# Patient Record
Sex: MALE | Race: WHITE | Employment: OTHER | URBAN - NONMETROPOLITAN AREA
[De-identification: names, ages, dates, MRNs, and addresses within clinical notes are randomized per-mention and may not be internally consistent; named-entity substitution may affect disease eponyms.]

---

## 2017-01-05 ENCOUNTER — ANTI-COAG VISIT (OUTPATIENT)
Dept: OTHER | Age: 77
End: 2017-01-05

## 2017-01-05 VITALS
HEART RATE: 86 BPM | SYSTOLIC BLOOD PRESSURE: 117 MMHG | DIASTOLIC BLOOD PRESSURE: 76 MMHG | WEIGHT: 157 LBS | BODY MASS INDEX: 23.87 KG/M2

## 2017-01-05 DIAGNOSIS — I48.19 PERSISTENT ATRIAL FIBRILLATION (HCC): ICD-10-CM

## 2017-01-05 LAB — POC INR: 1.9 (ref 0.8–1.2)

## 2017-01-05 PROCEDURE — 85610 PROTHROMBIN TIME: CPT | Performed by: NURSE PRACTITIONER

## 2017-01-05 PROCEDURE — 99999 PR OFFICE/OUTPT VISIT,PROCEDURE ONLY: CPT | Performed by: NURSE PRACTITIONER

## 2017-01-05 ASSESSMENT — ENCOUNTER SYMPTOMS
BLOOD IN STOOL: 0
DIARRHEA: 0
CONSTIPATION: 0
SHORTNESS OF BREATH: 0

## 2017-01-30 LAB
AVERAGE GLUCOSE: NORMAL
HBA1C MFR BLD: 5.5 %

## 2017-02-09 ENCOUNTER — ANTI-COAG VISIT (OUTPATIENT)
Dept: OTHER | Age: 77
End: 2017-02-09

## 2017-02-09 VITALS
DIASTOLIC BLOOD PRESSURE: 98 MMHG | WEIGHT: 159 LBS | BODY MASS INDEX: 24.18 KG/M2 | SYSTOLIC BLOOD PRESSURE: 140 MMHG | HEART RATE: 86 BPM

## 2017-02-09 DIAGNOSIS — I48.19 PERSISTENT ATRIAL FIBRILLATION (HCC): ICD-10-CM

## 2017-02-09 LAB
HCT VFR BLD CALC: 42.6 % (ref 42–52)
HEMOGLOBIN: 14.6 GM/DL (ref 14–18)
INR BLD: 7.1
INR BLD: 7.25 (ref 0.85–1.13)

## 2017-02-09 PROCEDURE — 4040F PNEUMOC VAC/ADMIN/RCVD: CPT | Performed by: NURSE PRACTITIONER

## 2017-02-09 PROCEDURE — G8427 DOCREV CUR MEDS BY ELIG CLIN: HCPCS | Performed by: NURSE PRACTITIONER

## 2017-02-09 PROCEDURE — 99999 PR OFFICE/OUTPT VISIT,PROCEDURE ONLY: CPT | Performed by: NURSE PRACTITIONER

## 2017-02-09 PROCEDURE — 85610 PROTHROMBIN TIME: CPT | Performed by: NURSE PRACTITIONER

## 2017-02-09 PROCEDURE — G8420 CALC BMI NORM PARAMETERS: HCPCS | Performed by: NURSE PRACTITIONER

## 2017-02-09 RX ORDER — SILDENAFIL 100 MG/1
100 TABLET, FILM COATED ORAL PRN
COMMUNITY
End: 2017-02-09

## 2017-02-09 ASSESSMENT — ENCOUNTER SYMPTOMS
CONSTIPATION: 0
SHORTNESS OF BREATH: 0
DIARRHEA: 0
BLOOD IN STOOL: 0

## 2017-02-13 ENCOUNTER — ANTI-COAG VISIT (OUTPATIENT)
Dept: OTHER | Age: 77
End: 2017-02-13

## 2017-02-13 VITALS
HEART RATE: 96 BPM | SYSTOLIC BLOOD PRESSURE: 109 MMHG | DIASTOLIC BLOOD PRESSURE: 67 MMHG | WEIGHT: 158.6 LBS | BODY MASS INDEX: 24.12 KG/M2

## 2017-02-13 DIAGNOSIS — I48.19 PERSISTENT ATRIAL FIBRILLATION (HCC): ICD-10-CM

## 2017-02-13 LAB — POC INR: 2.7 (ref 0.8–1.2)

## 2017-02-13 PROCEDURE — 99999 PR OFFICE/OUTPT VISIT,PROCEDURE ONLY: CPT | Performed by: NURSE PRACTITIONER

## 2017-02-13 PROCEDURE — G8427 DOCREV CUR MEDS BY ELIG CLIN: HCPCS | Performed by: NURSE PRACTITIONER

## 2017-02-13 PROCEDURE — 4040F PNEUMOC VAC/ADMIN/RCVD: CPT | Performed by: NURSE PRACTITIONER

## 2017-02-13 PROCEDURE — 85610 PROTHROMBIN TIME: CPT | Performed by: NURSE PRACTITIONER

## 2017-02-13 PROCEDURE — G8420 CALC BMI NORM PARAMETERS: HCPCS | Performed by: NURSE PRACTITIONER

## 2017-02-13 ASSESSMENT — ENCOUNTER SYMPTOMS
DIARRHEA: 0
BLOOD IN STOOL: 0
CONSTIPATION: 0
SHORTNESS OF BREATH: 0

## 2017-02-20 ENCOUNTER — ANTI-COAG VISIT (OUTPATIENT)
Dept: OTHER | Age: 77
End: 2017-02-20

## 2017-02-20 VITALS
SYSTOLIC BLOOD PRESSURE: 96 MMHG | DIASTOLIC BLOOD PRESSURE: 66 MMHG | WEIGHT: 153 LBS | BODY MASS INDEX: 23.26 KG/M2 | HEART RATE: 68 BPM

## 2017-02-20 DIAGNOSIS — R79.1 ELEVATED INR: Primary | ICD-10-CM

## 2017-02-20 DIAGNOSIS — I48.19 PERSISTENT ATRIAL FIBRILLATION (HCC): Primary | ICD-10-CM

## 2017-02-20 DIAGNOSIS — I48.19 PERSISTENT ATRIAL FIBRILLATION (HCC): ICD-10-CM

## 2017-02-20 LAB
HCT VFR BLD CALC: 41.2 % (ref 42–52)
HEMOGLOBIN: 13.5 GM/DL (ref 14–18)
INR BLD: 6.8
INR BLD: 7.22 (ref 0.85–1.13)

## 2017-02-20 PROCEDURE — 4040F PNEUMOC VAC/ADMIN/RCVD: CPT | Performed by: NURSE PRACTITIONER

## 2017-02-20 PROCEDURE — G8420 CALC BMI NORM PARAMETERS: HCPCS | Performed by: NURSE PRACTITIONER

## 2017-02-20 PROCEDURE — G8484 FLU IMMUNIZE NO ADMIN: HCPCS | Performed by: NURSE PRACTITIONER

## 2017-02-20 PROCEDURE — 99212 OFFICE O/P EST SF 10 MIN: CPT | Performed by: NURSE PRACTITIONER

## 2017-02-20 PROCEDURE — 1123F ACP DISCUSS/DSCN MKR DOCD: CPT | Performed by: NURSE PRACTITIONER

## 2017-02-20 PROCEDURE — G8427 DOCREV CUR MEDS BY ELIG CLIN: HCPCS | Performed by: NURSE PRACTITIONER

## 2017-02-20 PROCEDURE — 1036F TOBACCO NON-USER: CPT | Performed by: NURSE PRACTITIONER

## 2017-02-20 PROCEDURE — 85610 PROTHROMBIN TIME: CPT | Performed by: NURSE PRACTITIONER

## 2017-02-20 ASSESSMENT — ENCOUNTER SYMPTOMS
SHORTNESS OF BREATH: 0
BLOOD IN STOOL: 0
CONSTIPATION: 0
DIARRHEA: 1

## 2017-02-27 ENCOUNTER — ANTI-COAG VISIT (OUTPATIENT)
Dept: OTHER | Age: 77
End: 2017-02-27

## 2017-02-27 VITALS
BODY MASS INDEX: 23.87 KG/M2 | WEIGHT: 157 LBS | SYSTOLIC BLOOD PRESSURE: 123 MMHG | HEART RATE: 82 BPM | DIASTOLIC BLOOD PRESSURE: 74 MMHG

## 2017-02-27 DIAGNOSIS — I48.19 PERSISTENT ATRIAL FIBRILLATION (HCC): ICD-10-CM

## 2017-02-27 LAB — POC INR: 2.7 (ref 0.8–1.2)

## 2017-02-27 PROCEDURE — G8427 DOCREV CUR MEDS BY ELIG CLIN: HCPCS | Performed by: NURSE PRACTITIONER

## 2017-02-27 PROCEDURE — 1123F ACP DISCUSS/DSCN MKR DOCD: CPT | Performed by: NURSE PRACTITIONER

## 2017-02-27 PROCEDURE — G8420 CALC BMI NORM PARAMETERS: HCPCS | Performed by: NURSE PRACTITIONER

## 2017-02-27 PROCEDURE — G8484 FLU IMMUNIZE NO ADMIN: HCPCS | Performed by: NURSE PRACTITIONER

## 2017-02-27 PROCEDURE — 4040F PNEUMOC VAC/ADMIN/RCVD: CPT | Performed by: NURSE PRACTITIONER

## 2017-02-27 PROCEDURE — 85610 PROTHROMBIN TIME: CPT | Performed by: NURSE PRACTITIONER

## 2017-02-27 PROCEDURE — 99212 OFFICE O/P EST SF 10 MIN: CPT | Performed by: NURSE PRACTITIONER

## 2017-02-27 PROCEDURE — 1036F TOBACCO NON-USER: CPT | Performed by: NURSE PRACTITIONER

## 2017-02-27 RX ORDER — LISINOPRIL 40 MG/1
TABLET ORAL
Qty: 90 TABLET | Refills: 3 | Status: SHIPPED | OUTPATIENT
Start: 2017-02-27 | End: 2017-06-28 | Stop reason: DRUGHIGH

## 2017-02-27 ASSESSMENT — ENCOUNTER SYMPTOMS
CONSTIPATION: 0
SHORTNESS OF BREATH: 0
BLOOD IN STOOL: 0
DIARRHEA: 0

## 2017-03-22 DIAGNOSIS — I48.19 PERSISTENT ATRIAL FIBRILLATION (HCC): Primary | ICD-10-CM

## 2017-03-27 ENCOUNTER — ANTI-COAG VISIT (OUTPATIENT)
Dept: OTHER | Age: 77
End: 2017-03-27

## 2017-03-27 VITALS
DIASTOLIC BLOOD PRESSURE: 65 MMHG | SYSTOLIC BLOOD PRESSURE: 103 MMHG | BODY MASS INDEX: 23.72 KG/M2 | WEIGHT: 156 LBS | HEART RATE: 76 BPM

## 2017-03-27 DIAGNOSIS — I48.19 PERSISTENT ATRIAL FIBRILLATION (HCC): ICD-10-CM

## 2017-03-27 LAB — POC INR: 2.6 (ref 0.8–1.2)

## 2017-03-27 ASSESSMENT — ENCOUNTER SYMPTOMS
CONSTIPATION: 0
SHORTNESS OF BREATH: 0
BLOOD IN STOOL: 0
DIARRHEA: 0

## 2017-04-12 ENCOUNTER — PROCEDURE VISIT (OUTPATIENT)
Dept: CARDIOLOGY | Age: 77
End: 2017-04-12

## 2017-04-12 DIAGNOSIS — Z95.0 PACEMAKER: Primary | ICD-10-CM

## 2017-04-12 PROCEDURE — 93280 PM DEVICE PROGR EVAL DUAL: CPT | Performed by: INTERNAL MEDICINE

## 2017-04-24 ENCOUNTER — ANTI-COAG VISIT (OUTPATIENT)
Dept: OTHER | Age: 77
End: 2017-04-24

## 2017-04-24 ENCOUNTER — TELEPHONE (OUTPATIENT)
Dept: INTERNAL MEDICINE | Age: 77
End: 2017-04-24

## 2017-04-24 VITALS
SYSTOLIC BLOOD PRESSURE: 127 MMHG | HEART RATE: 70 BPM | BODY MASS INDEX: 23.87 KG/M2 | WEIGHT: 157 LBS | DIASTOLIC BLOOD PRESSURE: 82 MMHG

## 2017-04-24 DIAGNOSIS — I48.19 PERSISTENT ATRIAL FIBRILLATION (HCC): ICD-10-CM

## 2017-04-24 LAB
HCT VFR BLD CALC: 37.1 % (ref 42–52)
HEMOGLOBIN: 12.4 GM/DL (ref 14–18)
INR BLD: 5.8
INR BLD: 6.06 (ref 0.85–1.13)

## 2017-04-24 RX ORDER — OSELTAMIVIR PHOSPHATE 75 MG/1
CAPSULE ORAL
Refills: 0 | COMMUNITY
Start: 2017-02-23 | End: 2017-04-27 | Stop reason: ALTCHOICE

## 2017-04-24 RX ORDER — ERGOCALCIFEROL (VITAMIN D2) 1250 MCG
50000 CAPSULE ORAL
COMMUNITY
Start: 2017-03-29 | End: 2017-06-28 | Stop reason: ALTCHOICE

## 2017-04-24 ASSESSMENT — ENCOUNTER SYMPTOMS
SHORTNESS OF BREATH: 0
DIARRHEA: 0
CONSTIPATION: 0
BLOOD IN STOOL: 0

## 2017-04-27 ENCOUNTER — ANTI-COAG VISIT (OUTPATIENT)
Dept: OTHER | Age: 77
End: 2017-04-27

## 2017-04-27 VITALS
BODY MASS INDEX: 23.54 KG/M2 | HEART RATE: 73 BPM | WEIGHT: 154.8 LBS | DIASTOLIC BLOOD PRESSURE: 63 MMHG | SYSTOLIC BLOOD PRESSURE: 97 MMHG

## 2017-04-27 DIAGNOSIS — I48.19 PERSISTENT ATRIAL FIBRILLATION (HCC): ICD-10-CM

## 2017-04-27 LAB — POC INR: 3.4 (ref 0.8–1.2)

## 2017-04-27 ASSESSMENT — ENCOUNTER SYMPTOMS
DIARRHEA: 0
SHORTNESS OF BREATH: 0
CONSTIPATION: 0
BLOOD IN STOOL: 0

## 2017-05-04 ENCOUNTER — ANTI-COAG VISIT (OUTPATIENT)
Dept: OTHER | Age: 77
End: 2017-05-04

## 2017-05-04 VITALS
DIASTOLIC BLOOD PRESSURE: 67 MMHG | HEART RATE: 78 BPM | BODY MASS INDEX: 23.72 KG/M2 | WEIGHT: 156 LBS | SYSTOLIC BLOOD PRESSURE: 105 MMHG

## 2017-05-04 DIAGNOSIS — I48.19 PERSISTENT ATRIAL FIBRILLATION (HCC): ICD-10-CM

## 2017-05-04 LAB — POC INR: 4.1 (ref 0.8–1.2)

## 2017-05-04 ASSESSMENT — ENCOUNTER SYMPTOMS
DIARRHEA: 0
SHORTNESS OF BREATH: 0
CONSTIPATION: 0
BLOOD IN STOOL: 0

## 2017-05-15 ENCOUNTER — ANTI-COAG VISIT (OUTPATIENT)
Dept: OTHER | Age: 77
End: 2017-05-15

## 2017-05-15 VITALS
WEIGHT: 154.6 LBS | BODY MASS INDEX: 23.51 KG/M2 | HEART RATE: 53 BPM | DIASTOLIC BLOOD PRESSURE: 103 MMHG | SYSTOLIC BLOOD PRESSURE: 142 MMHG

## 2017-05-15 DIAGNOSIS — I48.19 PERSISTENT ATRIAL FIBRILLATION (HCC): ICD-10-CM

## 2017-05-15 LAB
INTERNATIONAL NORMALIZATION RATIO, POC: 2.9
POC INR: 2.9 (ref 0.8–1.2)

## 2017-05-15 ASSESSMENT — ENCOUNTER SYMPTOMS
CONSTIPATION: 0
DIARRHEA: 0
BLOOD IN STOOL: 0
SHORTNESS OF BREATH: 0

## 2017-05-30 ENCOUNTER — ANTI-COAG VISIT (OUTPATIENT)
Dept: OTHER | Age: 77
End: 2017-05-30

## 2017-05-30 VITALS
WEIGHT: 153 LBS | BODY MASS INDEX: 23.26 KG/M2 | SYSTOLIC BLOOD PRESSURE: 117 MMHG | DIASTOLIC BLOOD PRESSURE: 75 MMHG | HEART RATE: 71 BPM

## 2017-05-30 DIAGNOSIS — I48.19 PERSISTENT ATRIAL FIBRILLATION (HCC): ICD-10-CM

## 2017-05-30 LAB — POC INR: 2.7 (ref 0.8–1.2)

## 2017-05-30 ASSESSMENT — ENCOUNTER SYMPTOMS
DIARRHEA: 0
BLOOD IN STOOL: 0
CONSTIPATION: 0
SHORTNESS OF BREATH: 0

## 2017-06-13 ENCOUNTER — ANTI-COAG VISIT (OUTPATIENT)
Dept: OTHER | Age: 77
End: 2017-06-13

## 2017-06-13 VITALS
HEART RATE: 79 BPM | SYSTOLIC BLOOD PRESSURE: 106 MMHG | WEIGHT: 150.4 LBS | DIASTOLIC BLOOD PRESSURE: 76 MMHG | BODY MASS INDEX: 22.87 KG/M2

## 2017-06-13 DIAGNOSIS — I48.19 PERSISTENT ATRIAL FIBRILLATION (HCC): ICD-10-CM

## 2017-06-13 LAB — POC INR: 1.8 (ref 0.8–1.2)

## 2017-06-13 ASSESSMENT — ENCOUNTER SYMPTOMS
SHORTNESS OF BREATH: 0
BLOOD IN STOOL: 0
CONSTIPATION: 0
DIARRHEA: 0

## 2017-06-28 ENCOUNTER — OFFICE VISIT (OUTPATIENT)
Dept: CARDIOLOGY | Age: 77
End: 2017-06-28

## 2017-06-28 VITALS
BODY MASS INDEX: 22.22 KG/M2 | WEIGHT: 150 LBS | DIASTOLIC BLOOD PRESSURE: 62 MMHG | SYSTOLIC BLOOD PRESSURE: 100 MMHG | HEART RATE: 66 BPM | HEIGHT: 69 IN

## 2017-06-28 DIAGNOSIS — I48.19 PERSISTENT ATRIAL FIBRILLATION (HCC): Primary | ICD-10-CM

## 2017-06-28 PROCEDURE — 99213 OFFICE O/P EST LOW 20 MIN: CPT | Performed by: INTERNAL MEDICINE

## 2017-06-28 PROCEDURE — 1123F ACP DISCUSS/DSCN MKR DOCD: CPT | Performed by: INTERNAL MEDICINE

## 2017-06-28 PROCEDURE — 93000 ELECTROCARDIOGRAM COMPLETE: CPT | Performed by: INTERNAL MEDICINE

## 2017-06-28 PROCEDURE — 4040F PNEUMOC VAC/ADMIN/RCVD: CPT | Performed by: INTERNAL MEDICINE

## 2017-06-28 PROCEDURE — 1036F TOBACCO NON-USER: CPT | Performed by: INTERNAL MEDICINE

## 2017-06-28 PROCEDURE — G8427 DOCREV CUR MEDS BY ELIG CLIN: HCPCS | Performed by: INTERNAL MEDICINE

## 2017-06-28 PROCEDURE — G8420 CALC BMI NORM PARAMETERS: HCPCS | Performed by: INTERNAL MEDICINE

## 2017-06-28 RX ORDER — LISINOPRIL 20 MG/1
20 TABLET ORAL DAILY
COMMUNITY
End: 2018-01-31

## 2017-07-05 ENCOUNTER — ANTI-COAG VISIT (OUTPATIENT)
Dept: OTHER | Age: 77
End: 2017-07-05

## 2017-07-05 VITALS
WEIGHT: 147.6 LBS | DIASTOLIC BLOOD PRESSURE: 76 MMHG | HEART RATE: 76 BPM | SYSTOLIC BLOOD PRESSURE: 114 MMHG | BODY MASS INDEX: 21.8 KG/M2

## 2017-07-05 DIAGNOSIS — I48.19 PERSISTENT ATRIAL FIBRILLATION (HCC): ICD-10-CM

## 2017-07-05 LAB — POC INR: 3.1 (ref 0.8–1.2)

## 2017-07-05 ASSESSMENT — ENCOUNTER SYMPTOMS
DIARRHEA: 0
BLOOD IN STOOL: 0
SHORTNESS OF BREATH: 0
CONSTIPATION: 0

## 2017-07-17 ENCOUNTER — HOSPITAL ENCOUNTER (OUTPATIENT)
Dept: PHARMACY | Age: 77
Setting detail: THERAPIES SERIES
Discharge: HOME | End: 2017-07-17
Payer: MEDICARE

## 2017-07-17 VITALS
WEIGHT: 151 LBS | HEART RATE: 78 BPM | BODY MASS INDEX: 22.3 KG/M2 | SYSTOLIC BLOOD PRESSURE: 102 MMHG | DIASTOLIC BLOOD PRESSURE: 67 MMHG

## 2017-07-17 DIAGNOSIS — I48.19 PERSISTENT ATRIAL FIBRILLATION (HCC): ICD-10-CM

## 2017-07-17 LAB — INTERNATIONAL NORMALIZATION RATIO, POC: 1.3

## 2017-07-17 PROCEDURE — 85610 PROTHROMBIN TIME: CPT

## 2017-07-17 PROCEDURE — 99211 OFF/OP EST MAY X REQ PHY/QHP: CPT

## 2017-07-17 PROCEDURE — 36416 COLLJ CAPILLARY BLOOD SPEC: CPT

## 2017-07-24 ENCOUNTER — HOSPITAL ENCOUNTER (OUTPATIENT)
Dept: PHARMACY | Age: 77
Setting detail: THERAPIES SERIES
Discharge: HOME | End: 2017-07-24
Payer: MEDICARE

## 2017-07-24 VITALS
BODY MASS INDEX: 21.86 KG/M2 | DIASTOLIC BLOOD PRESSURE: 74 MMHG | SYSTOLIC BLOOD PRESSURE: 105 MMHG | WEIGHT: 148 LBS | HEART RATE: 80 BPM

## 2017-07-24 DIAGNOSIS — I48.19 PERSISTENT ATRIAL FIBRILLATION (HCC): ICD-10-CM

## 2017-07-24 LAB — POC INR: 1.5 (ref 0.8–1.2)

## 2017-07-24 PROCEDURE — 36416 COLLJ CAPILLARY BLOOD SPEC: CPT

## 2017-07-24 PROCEDURE — 99211 OFF/OP EST MAY X REQ PHY/QHP: CPT

## 2017-07-24 PROCEDURE — 85610 PROTHROMBIN TIME: CPT

## 2017-07-24 ASSESSMENT — ENCOUNTER SYMPTOMS
SHORTNESS OF BREATH: 0
CONSTIPATION: 0
DIARRHEA: 1
BLOOD IN STOOL: 0

## 2017-08-03 ENCOUNTER — HOSPITAL ENCOUNTER (OUTPATIENT)
Dept: PHARMACY | Age: 77
Setting detail: THERAPIES SERIES
Discharge: HOME | End: 2017-08-03
Payer: MEDICARE

## 2017-08-03 VITALS
WEIGHT: 147 LBS | BODY MASS INDEX: 21.71 KG/M2 | SYSTOLIC BLOOD PRESSURE: 128 MMHG | HEART RATE: 76 BPM | DIASTOLIC BLOOD PRESSURE: 86 MMHG

## 2017-08-03 DIAGNOSIS — I48.19 PERSISTENT ATRIAL FIBRILLATION (HCC): ICD-10-CM

## 2017-08-03 LAB — POC INR: 1.9 (ref 0.8–1.2)

## 2017-08-03 PROCEDURE — 99211 OFF/OP EST MAY X REQ PHY/QHP: CPT

## 2017-08-03 PROCEDURE — 85610 PROTHROMBIN TIME: CPT

## 2017-08-03 PROCEDURE — 36416 COLLJ CAPILLARY BLOOD SPEC: CPT

## 2017-08-03 RX ORDER — ARIPIPRAZOLE 2 MG/1
2 TABLET ORAL DAILY
COMMUNITY

## 2017-08-17 ENCOUNTER — HOSPITAL ENCOUNTER (OUTPATIENT)
Dept: PHARMACY | Age: 77
Setting detail: THERAPIES SERIES
Discharge: HOME | End: 2017-08-17
Payer: MEDICARE

## 2017-08-17 VITALS
BODY MASS INDEX: 22.12 KG/M2 | DIASTOLIC BLOOD PRESSURE: 72 MMHG | WEIGHT: 149.8 LBS | HEART RATE: 65 BPM | SYSTOLIC BLOOD PRESSURE: 109 MMHG

## 2017-08-17 DIAGNOSIS — I48.19 PERSISTENT ATRIAL FIBRILLATION (HCC): ICD-10-CM

## 2017-08-17 DIAGNOSIS — C34.90 MALIGNANT NEOPLASM OF LUNG, UNSPECIFIED LATERALITY, UNSPECIFIED PART OF LUNG (HCC): ICD-10-CM

## 2017-08-17 LAB — POC INR: 1.6 (ref 0.8–1.2)

## 2017-08-17 PROCEDURE — 36416 COLLJ CAPILLARY BLOOD SPEC: CPT

## 2017-08-17 PROCEDURE — 85610 PROTHROMBIN TIME: CPT

## 2017-08-17 PROCEDURE — 99211 OFF/OP EST MAY X REQ PHY/QHP: CPT

## 2017-09-06 ENCOUNTER — HOSPITAL ENCOUNTER (OUTPATIENT)
Dept: PHARMACY | Age: 77
Setting detail: THERAPIES SERIES
Discharge: HOME | End: 2017-09-06
Payer: MEDICARE

## 2017-09-06 VITALS
HEART RATE: 77 BPM | WEIGHT: 151.2 LBS | SYSTOLIC BLOOD PRESSURE: 121 MMHG | BODY MASS INDEX: 22.33 KG/M2 | DIASTOLIC BLOOD PRESSURE: 81 MMHG

## 2017-09-06 DIAGNOSIS — I48.19 PERSISTENT ATRIAL FIBRILLATION (HCC): ICD-10-CM

## 2017-09-06 LAB — POC INR: 1.6 (ref 0.8–1.2)

## 2017-09-06 PROCEDURE — 99211 OFF/OP EST MAY X REQ PHY/QHP: CPT

## 2017-09-06 PROCEDURE — 36416 COLLJ CAPILLARY BLOOD SPEC: CPT

## 2017-09-06 PROCEDURE — 85610 PROTHROMBIN TIME: CPT

## 2017-09-06 ASSESSMENT — ENCOUNTER SYMPTOMS
CONSTIPATION: 0
DIARRHEA: 0
BLOOD IN STOOL: 0
SHORTNESS OF BREATH: 0

## 2017-09-20 ENCOUNTER — HOSPITAL ENCOUNTER (OUTPATIENT)
Dept: PHARMACY | Age: 77
Setting detail: THERAPIES SERIES
Discharge: HOME | End: 2017-09-20
Payer: MEDICARE

## 2017-09-20 VITALS
BODY MASS INDEX: 22.51 KG/M2 | WEIGHT: 152.4 LBS | HEART RATE: 73 BPM | DIASTOLIC BLOOD PRESSURE: 69 MMHG | SYSTOLIC BLOOD PRESSURE: 114 MMHG

## 2017-09-20 DIAGNOSIS — I48.19 PERSISTENT ATRIAL FIBRILLATION (HCC): ICD-10-CM

## 2017-09-20 LAB — POC INR: 1.5 (ref 0.8–1.2)

## 2017-09-20 PROCEDURE — 85610 PROTHROMBIN TIME: CPT

## 2017-09-20 PROCEDURE — 36416 COLLJ CAPILLARY BLOOD SPEC: CPT

## 2017-09-20 PROCEDURE — 99211 OFF/OP EST MAY X REQ PHY/QHP: CPT

## 2017-09-20 ASSESSMENT — ENCOUNTER SYMPTOMS
CONSTIPATION: 0
BLOOD IN STOOL: 0
SHORTNESS OF BREATH: 0
DIARRHEA: 0

## 2017-10-02 ENCOUNTER — TELEPHONE (OUTPATIENT)
Dept: PHARMACY | Age: 77
End: 2017-10-02

## 2017-10-02 DIAGNOSIS — I48.19 PERSISTENT ATRIAL FIBRILLATION (HCC): Primary | ICD-10-CM

## 2017-10-02 NOTE — TELEPHONE ENCOUNTER
Patient had a recent dose change and needs an INR this week. Please call and reschedule for sometime this week.

## 2017-10-09 ENCOUNTER — ANTI-COAG VISIT (OUTPATIENT)
Dept: PHARMACY | Age: 77
End: 2017-10-09

## 2017-10-09 DIAGNOSIS — I48.19 PERSISTENT ATRIAL FIBRILLATION (HCC): ICD-10-CM

## 2017-10-09 LAB — INR BLD: 3.7

## 2017-10-23 ENCOUNTER — HOSPITAL ENCOUNTER (OUTPATIENT)
Dept: PHARMACY | Age: 77
Setting detail: THERAPIES SERIES
Discharge: HOME | End: 2017-10-23
Payer: MEDICARE

## 2017-10-23 VITALS
DIASTOLIC BLOOD PRESSURE: 84 MMHG | HEART RATE: 64 BPM | SYSTOLIC BLOOD PRESSURE: 124 MMHG | WEIGHT: 154 LBS | BODY MASS INDEX: 22.74 KG/M2

## 2017-10-23 DIAGNOSIS — I48.19 PERSISTENT ATRIAL FIBRILLATION (HCC): ICD-10-CM

## 2017-10-23 DIAGNOSIS — Z86.711 HISTORY OF PULMONARY EMBOLISM: ICD-10-CM

## 2017-10-23 DIAGNOSIS — Z79.01 ANTICOAGULATED ON COUMADIN: Primary | ICD-10-CM

## 2017-10-23 LAB
HCT VFR BLD CALC: 39.8 % (ref 42–52)
HEMOGLOBIN: 13.2 GM/DL (ref 14–18)
INR BLD: 7.45 (ref 0.85–1.13)
INTERNATIONAL NORMALIZATION RATIO, POC: 6.8

## 2017-10-23 PROCEDURE — 85610 PROTHROMBIN TIME: CPT

## 2017-10-23 PROCEDURE — 36415 COLL VENOUS BLD VENIPUNCTURE: CPT

## 2017-10-23 PROCEDURE — 36416 COLLJ CAPILLARY BLOOD SPEC: CPT

## 2017-10-23 PROCEDURE — 99211 OFF/OP EST MAY X REQ PHY/QHP: CPT

## 2017-10-23 NOTE — PROGRESS NOTES
The [address] [facility]  Anticoagulation Clinic  [phone] (phone)                            [phone] (fax)    Subjective:      Patient ID: Carlos Moore is a 68 y.o. male. Patient presents for 2 week INR check. Patient in for anticoagulation management due to persistent atrial fibrillation with a goal INR of 2.0-3.0. H/o persistent a-fib, recurrent PEs. INR ordered and reviewed today. Patient's wife passed away over the weekend so he has not been very active. States he eats about the same but that was not very much to begin with. Patient reports the following:    Medication changes: No new medications  Tablet strength per patient: 2.5mg and 5mg   Patient reported dosing regimen over last 1 week: \"I always just follow that map you guys give me\"  Missed doses in the last 1-2 weeks: Not sure, since he has not been paying too close attention after his wife's death this weekend  Extra doses in the last 1-2 weeks: Not sure, but thinks he did not double up on any doses  Any problems with bleeding/bruising? No  Any recent falls? No   Any signs or symptoms of DVT/PE or stroke? No  Alcohol use: No  Tobacco use: No  Diet changes as follows: No changes   Green leafy intake: No   Grapefruit juice: No  Upcoming surgeries or procedures: None  Appointment preference: PM- early afternoons        Review of Systems   Constitutional: POSITIVE for activity change, patient states he sleeps a lot more. No appetite change. HENT: Negative for nosebleeds. Respiratory: Negative for shortness of breath. Cardiovascular: Negative for chest pain and leg swelling. Gastrointestinal: Negative for blood in stool, constipation and diarrhea. Genitourinary: Negative for hematuria. Neurological: Negative for weakness, light-headedness and headaches. Hematological: Does not bruise/bleed easily.        Objective:       Assessment:       Lab Results   Component Value Date    INR 7.45 (HH)

## 2017-10-25 ENCOUNTER — HOSPITAL ENCOUNTER (OUTPATIENT)
Dept: PHARMACY | Age: 77
Setting detail: THERAPIES SERIES
Discharge: HOME | End: 2017-10-25
Payer: MEDICARE

## 2017-10-25 ENCOUNTER — APPOINTMENT (OUTPATIENT)
Dept: PHARMACY | Age: 77
End: 2017-10-25
Payer: MEDICARE

## 2017-10-25 ENCOUNTER — TELEPHONE (OUTPATIENT)
Dept: PHARMACY | Age: 77
End: 2017-10-25

## 2017-10-25 VITALS
SYSTOLIC BLOOD PRESSURE: 125 MMHG | BODY MASS INDEX: 22.39 KG/M2 | WEIGHT: 151.6 LBS | DIASTOLIC BLOOD PRESSURE: 85 MMHG | HEART RATE: 77 BPM

## 2017-10-25 DIAGNOSIS — I48.19 PERSISTENT ATRIAL FIBRILLATION (HCC): ICD-10-CM

## 2017-10-25 LAB — POC INR: 3.3 (ref 0.8–1.2)

## 2017-10-25 PROCEDURE — 36416 COLLJ CAPILLARY BLOOD SPEC: CPT

## 2017-10-25 PROCEDURE — 99211 OFF/OP EST MAY X REQ PHY/QHP: CPT

## 2017-10-25 PROCEDURE — 85610 PROTHROMBIN TIME: CPT

## 2017-10-25 RX ORDER — WARFARIN SODIUM 5 MG/1
TABLET ORAL
Qty: 120 TABLET | Refills: 3 | Status: SHIPPED | OUTPATIENT
Start: 2017-10-25 | End: 2018-01-01

## 2017-10-25 RX ORDER — WARFARIN SODIUM 2.5 MG/1
TABLET ORAL
Qty: 45 TABLET | Refills: 5 | Status: SHIPPED | OUTPATIENT
Start: 2017-10-25 | End: 2018-01-01

## 2017-10-25 ASSESSMENT — ENCOUNTER SYMPTOMS
CONSTIPATION: 0
BLOOD IN STOOL: 0
SHORTNESS OF BREATH: 0
DIARRHEA: 0

## 2017-10-25 NOTE — TELEPHONE ENCOUNTER
Patient came back in and stated he forgot to tell you that he need scripts for Coumadin  5 and 2.5     He would like it called to [facility] in [place]  He will check with the Pharmacy this afternoon.

## 2017-11-01 ENCOUNTER — HOSPITAL ENCOUNTER (OUTPATIENT)
Dept: PHARMACY | Age: 77
Setting detail: THERAPIES SERIES
Discharge: HOME | End: 2017-11-01
Payer: MEDICARE

## 2017-11-01 VITALS
BODY MASS INDEX: 22.98 KG/M2 | WEIGHT: 155.6 LBS | HEART RATE: 81 BPM | SYSTOLIC BLOOD PRESSURE: 139 MMHG | DIASTOLIC BLOOD PRESSURE: 97 MMHG

## 2017-11-01 DIAGNOSIS — I48.19 PERSISTENT ATRIAL FIBRILLATION (HCC): ICD-10-CM

## 2017-11-01 LAB — POC INR: 1.8 (ref 0.8–1.2)

## 2017-11-01 PROCEDURE — 36416 COLLJ CAPILLARY BLOOD SPEC: CPT

## 2017-11-01 PROCEDURE — 85610 PROTHROMBIN TIME: CPT

## 2017-11-01 PROCEDURE — 99211 OFF/OP EST MAY X REQ PHY/QHP: CPT

## 2017-11-01 NOTE — PROGRESS NOTES
The [facility]  Anticoagulation Clinic  [phone] (phone)           [phone] (fax)    Visit Date: 11/1/2017     Subjective:       Patient ID: Darwin Ramos, 68 y.o., male    HPI      Patient presents for 1 week INR check. Patient in for anticoagulation management due to atrial fibrillation with a goal INR of 2.0-3.0. INR ordered and reviewed today. Patient reports the following:    Medication changes: no change  Tablet strength per patient: 5 mg   Patient reported dosing regimen over last 1 week: 2.5 mg x1, then 3.75 mg daily  Missed doses in the last 1-2 weeks: no  Extra doses in the last 1-2 weeks: no  Any problems with bleeding/bruising? No  Any recent falls? No   Any signs or symptoms of DVT/PE or stroke? No  Alcohol use: no change   Tobacco use: no change  Diet changes as follows: No changes  Upcoming surgeries or procedures: no  Appointment preference: PM    Review of Systems   Constitutional: Negative for activity change, appetite change and fatigue. HENT: Negative for nosebleeds. Respiratory: Negative for shortness of breath. Cardiovascular: Negative for chest pain and leg swelling. Gastrointestinal: Negative for blood in stool, constipation and diarrhea. Genitourinary: Negative for hematuria. Neurological: Negative for weakness, light-headedness and headaches. Hematological: Does not bruise/bleed easily.      Objective:   Physical Exam   Vitals:    11/01/17 1325   BP: (!) 139/97   Pulse: 81     Assessment:      Lab Results   Component Value Date    INR 1.80 (H) 11/01/2017    INR 3.30 (H) 10/25/2017    INR 7.45 (HH) 10/23/2017     INR subtherapeutic   Recent Labs      11/01/17   1325   INR  1.80*                          Plan:   Continue Coumadin 5 mg W, 3.75 mg SMTTHFS. Recheck INR 2 weeks. Patient reminded to call the Anticoagulation Clinic with any signs or symptoms of bleeding or with any medication changes.   Patient given

## 2017-11-09 ENCOUNTER — HOSPITAL ENCOUNTER (OUTPATIENT)
Dept: GENERAL RADIOLOGY | Age: 77
Discharge: HOME | End: 2017-11-09
Payer: MEDICARE

## 2017-11-09 ENCOUNTER — HOSPITAL ENCOUNTER (OUTPATIENT)
Age: 77
Discharge: HOME | End: 2017-11-09
Payer: MEDICARE

## 2017-11-09 DIAGNOSIS — M25.512 LEFT SHOULDER PAIN, UNSPECIFIED CHRONICITY: ICD-10-CM

## 2017-11-09 LAB
TSH SERPL DL<=0.05 MIU/L-ACNC: 1.28 UIU/ML (ref 0.4–4.2)
VITAMIN D 25-HYDROXY: 43 NG/ML (ref 30–100)

## 2017-11-09 PROCEDURE — 36415 COLL VENOUS BLD VENIPUNCTURE: CPT

## 2017-11-09 PROCEDURE — 82306 VITAMIN D 25 HYDROXY: CPT

## 2017-11-09 PROCEDURE — 84403 ASSAY OF TOTAL TESTOSTERONE: CPT

## 2017-11-09 PROCEDURE — 73030 X-RAY EXAM OF SHOULDER: CPT

## 2017-11-09 PROCEDURE — 84443 ASSAY THYROID STIM HORMONE: CPT

## 2017-11-10 ENCOUNTER — HOSPITAL ENCOUNTER (OUTPATIENT)
Age: 77
Discharge: HOME | End: 2017-11-10
Payer: MEDICARE

## 2017-11-10 LAB
BASOPHILS # BLD: 0.5 %
BASOPHILS ABSOLUTE: 0 THOU/MM3 (ref 0–0.1)
CHARACTER,SYN.FL: ABNORMAL
COLOR FLUID: YELLOW
CRYSTALS, FLUID: ABNORMAL
EOSINOPHIL # BLD: 2 %
EOSINOPHILS ABSOLUTE: 0.1 THOU/MM3 (ref 0–0.4)
HCT VFR BLD CALC: 39.3 % (ref 42–52)
HEMOGLOBIN: 12.9 GM/DL (ref 14–18)
LYMPHOCYTES # BLD: 10.5 %
LYMPHOCYTES ABSOLUTE: 0.6 THOU/MM3 (ref 1–4.8)
MCH RBC QN AUTO: 30.5 PG (ref 27–31)
MCHC RBC AUTO-ENTMCNC: 32.9 GM/DL (ref 33–37)
MCV RBC AUTO: 92.7 FL (ref 80–94)
MONOCYTE, FLUID: 2 % (ref 25–95)
MONOCYTES # BLD: 10.4 %
MONOCYTES ABSOLUTE: 0.6 THOU/MM3 (ref 0.4–1.3)
NUCLEATED RED BLOOD CELLS: 0 /100 WBC
PDW BLD-RTO: 14.1 % (ref 11.5–14.5)
PLATELET # BLD: 153 THOU/MM3 (ref 130–400)
PMV BLD AUTO: 7.9 MCM (ref 7.4–10.4)
RBC # BLD: 4.24 MILL/MM3 (ref 4.7–6.1)
SEDIMENTATION RATE, ERYTHROCYTE: 46 MM/HR (ref 0–10)
SEG NEUTROPHILS: 76.6 %
SEGMENTED NEUTROPHILS ABSOLUTE COUNT: 4.3 THOU/MM3 (ref 1.8–7.7)
SEGS, SYNOVIAL FLUID: 98 % (ref 0–25)
URIC ACID: 4.8 MG/DL (ref 3.7–7)
WBC # BLD: 5.6 THOU/MM3 (ref 4.8–10.8)
WBC FLUID: ABNORMAL MM3

## 2017-11-10 PROCEDURE — 87205 SMEAR GRAM STAIN: CPT

## 2017-11-10 PROCEDURE — 87070 CULTURE OTHR SPECIMN AEROBIC: CPT

## 2017-11-10 PROCEDURE — 87075 CULTR BACTERIA EXCEPT BLOOD: CPT

## 2017-11-10 PROCEDURE — 85651 RBC SED RATE NONAUTOMATED: CPT

## 2017-11-10 PROCEDURE — 36415 COLL VENOUS BLD VENIPUNCTURE: CPT

## 2017-11-10 PROCEDURE — 89051 BODY FLUID CELL COUNT: CPT

## 2017-11-10 PROCEDURE — 89060 EXAM SYNOVIAL FLUID CRYSTALS: CPT

## 2017-11-10 PROCEDURE — 86141 C-REACTIVE PROTEIN HS: CPT

## 2017-11-10 PROCEDURE — 85025 COMPLETE CBC W/AUTO DIFF WBC: CPT

## 2017-11-10 PROCEDURE — 84550 ASSAY OF BLOOD/URIC ACID: CPT

## 2017-11-11 LAB
C-REACTIVE PROTEIN, HIGH SENSITIVITY: 200.8 MG/L
TESTOSTERONE TOTAL: 363 NG/DL (ref 300–720)

## 2017-11-14 ENCOUNTER — HOSPITAL ENCOUNTER (OUTPATIENT)
Dept: CT IMAGING | Age: 77
Discharge: HOME | End: 2017-11-14
Payer: MEDICARE

## 2017-11-14 DIAGNOSIS — G44.319 ACUTE POST-TRAUMATIC HEADACHE, NOT INTRACTABLE: ICD-10-CM

## 2017-11-14 DIAGNOSIS — H35.62 RETINAL HEMORRHAGE OF LEFT EYE: ICD-10-CM

## 2017-11-14 PROCEDURE — 70450 CT HEAD/BRAIN W/O DYE: CPT

## 2017-11-15 ENCOUNTER — HOSPITAL ENCOUNTER (OUTPATIENT)
Dept: PHARMACY | Age: 77
Setting detail: THERAPIES SERIES
Discharge: HOME | End: 2017-11-15
Payer: MEDICARE

## 2017-11-15 VITALS
WEIGHT: 152.4 LBS | DIASTOLIC BLOOD PRESSURE: 82 MMHG | SYSTOLIC BLOOD PRESSURE: 110 MMHG | HEART RATE: 68 BPM | BODY MASS INDEX: 22.51 KG/M2 | RESPIRATION RATE: 16 BRPM

## 2017-11-15 DIAGNOSIS — I48.19 PERSISTENT ATRIAL FIBRILLATION (HCC): ICD-10-CM

## 2017-11-15 LAB
AEROBIC CULTURE: NORMAL
ANAEROBIC CULTURE: NORMAL
GRAM STAIN RESULT: NORMAL
HCT VFR BLD CALC: 40.2 % (ref 42–52)
HEMOGLOBIN: 13.2 GM/DL (ref 14–18)
INR BLD: 6.61 (ref 0.85–1.13)

## 2017-11-15 PROCEDURE — 85610 PROTHROMBIN TIME: CPT

## 2017-11-15 PROCEDURE — 99211 OFF/OP EST MAY X REQ PHY/QHP: CPT

## 2017-11-15 PROCEDURE — 36415 COLL VENOUS BLD VENIPUNCTURE: CPT

## 2017-11-15 ASSESSMENT — ENCOUNTER SYMPTOMS
BLOOD IN STOOL: 0
DIARRHEA: 0
SHORTNESS OF BREATH: 0
CONSTIPATION: 0

## 2017-11-15 NOTE — PROGRESS NOTES
The [facility]  Anticoagulation Clinic  [phone] (phone)           [phone] (fax)    Visit Date: 11/15/2017     Subjective:       Patient ID: Eunice Robert, 68 y.o., male    HPI  Here for 2 week coumadin monitoring for diagnosis of persistent atrial fib. States correct coumadin dose and tablet strength. Denies missed or extra doses. Took today's dose this morning. Left outer sclera red, denies pain or visual changes. [facility] and hit head. Did not hit hard, caught himself as he fell. Did not tell anyone. Having bone scan on 11-20-17. Review of Systems   Constitutional: Negative for activity change, appetite change and fatigue. HENT: Negative for nosebleeds. Respiratory: Negative for shortness of breath. Cardiovascular: Positive for chest pain ( has been seen). Negative for leg swelling. Gastrointestinal: Negative for blood in stool, constipation and diarrhea. Genitourinary: Negative for hematuria. Neurological: Negative for dizziness, weakness, light-headedness and headaches ( saw OIO.). Hematological: Does not bruise/bleed easily.        Objective:   Physical Exam   Vitals:    11/15/17 1410   BP: 110/82   Pulse: 68   Resp: 16       Physical Exam   Constitutional: He is oriented to person, place, and time. He appears well-developed and well-nourished. Cardiovascular: Normal rate. Pulmonary/Chest: Effort normal.   Neurological: He is alert and oriented to person, place, and time. Psychiatric: He has a normal mood and affect. His behavior is normal.       Assessment:     Lab Results   Component Value Date    INR 6.61 (HH) 11/15/2017    INR 1.80 (H) 11/01/2017    INR 3.30 (H) 10/25/2017     INR supratherapeutic   Recent Labs      11/15/17   1404   INR  6.61*                           Plan:   POCT INR, venus INR, and H&H ordered and result reviewed with Kev, Pharm. D.  Order received and verified to hold coumadin today and tomorrow [place] [facility]), then take 2.5 mg on 11-18 and 11-19-17. Recheck INR 5 days. Patient reminded to call the Anticoagulation Clinic with any signs or symptoms of bleeding or with any medication changes. Patient given instructions utilizing the teach back method. Discharged ambulatory in no apparent distress.   H&H result 13.2/40.2

## 2017-11-20 ENCOUNTER — HOSPITAL ENCOUNTER (OUTPATIENT)
Dept: PHARMACY | Age: 77
Setting detail: THERAPIES SERIES
Discharge: HOME | End: 2017-11-20
Payer: MEDICARE

## 2017-11-20 ENCOUNTER — HOSPITAL ENCOUNTER (OUTPATIENT)
Dept: NUCLEAR MEDICINE | Age: 77
Discharge: HOME | End: 2017-11-20
Payer: MEDICARE

## 2017-11-20 VITALS
HEART RATE: 76 BPM | DIASTOLIC BLOOD PRESSURE: 93 MMHG | WEIGHT: 153.4 LBS | BODY MASS INDEX: 22.65 KG/M2 | SYSTOLIC BLOOD PRESSURE: 141 MMHG

## 2017-11-20 DIAGNOSIS — M25.519 ARTHRALGIA OF SHOULDER, UNSPECIFIED LATERALITY: ICD-10-CM

## 2017-11-20 DIAGNOSIS — M25.412 EFFUSION OF JOINT OF LEFT SHOULDER: ICD-10-CM

## 2017-11-20 DIAGNOSIS — I48.19 PERSISTENT ATRIAL FIBRILLATION (HCC): ICD-10-CM

## 2017-11-20 LAB — POC INR: 2.5 (ref 0.8–1.2)

## 2017-11-20 PROCEDURE — 36416 COLLJ CAPILLARY BLOOD SPEC: CPT | Performed by: PHARMACIST

## 2017-11-20 PROCEDURE — 85610 PROTHROMBIN TIME: CPT | Performed by: PHARMACIST

## 2017-11-20 PROCEDURE — A9503 TC99M MEDRONATE: HCPCS | Performed by: ORTHOPAEDIC SURGERY

## 2017-11-20 PROCEDURE — 3430000000 HC RX DIAGNOSTIC RADIOPHARMACEUTICAL: Performed by: ORTHOPAEDIC SURGERY

## 2017-11-20 PROCEDURE — 99211 OFF/OP EST MAY X REQ PHY/QHP: CPT | Performed by: PHARMACIST

## 2017-11-20 PROCEDURE — 78306 BONE IMAGING WHOLE BODY: CPT

## 2017-11-20 RX ORDER — TC 99M MEDRONATE 20 MG/10ML
25 INJECTION, POWDER, LYOPHILIZED, FOR SOLUTION INTRAVENOUS
Status: COMPLETED | OUTPATIENT
Start: 2017-11-20 | End: 2017-11-20

## 2017-11-20 RX ADMIN — Medication 27.7 MILLICURIE: at 11:57

## 2017-11-20 NOTE — PROGRESS NOTES
The [facility]ication [facility]  Anticoagulation Clinic  [phone] (phone)           [phone] (fax)    Visit Date: 11/20/2017     Subjective:       Patient ID: Tita Munroe, 68 y.o., male    HPI      Patient presents for 5 day INR check. Patient in for anticoagulation management due to atrial fibrillation with a goal INR of 2.0-3.0. INR ordered and reviewed today. Patient reports the following:    Medication changes: no change  Tablet strength per patient: 5 mg, 2.5mg  Patient reported dosing regimen over last 1 week: Held x 2, 2.5mg x 2  Missed doses in the last 1-2 weeks: no  Extra doses in the last 1-2 weeks: no  Any problems with bleeding/bruising? No  Any recent falls? No   Any signs or symptoms of DVT/PE or stroke? No  Alcohol use: no change   Tobacco use: no change  Diet changes as follows: No changes  Upcoming surgeries or procedures: no  Appointment preference: PM    Review of Systems   Constitutional: Negative for activity change, appetite change and fatigue. HENT: Negative for nosebleeds. He still has a red eye. Will see eye dr today. Denies any pain or vision changes. Respiratory: Negative for shortness of breath. Cardiovascular: Negative for chest pain and leg swelling. Gastrointestinal: Negative for blood in stool, constipation and diarrhea. Genitourinary: Negative for hematuria. Neurological: Negative for weakness, light-headedness and headaches. More dizziness lately but no falls   Hematological: Does not bruise/bleed easily.      Objective:   Physical Exam   Vitals:    11/20/17 1052   BP: (!) 141/93   Pulse: 76     Assessment:      Lab Results   Component Value Date    INR 2.50 (H) 11/20/2017    INR 6.61 (HH) 11/15/2017    INR 1.80 (H) 11/01/2017     INR subtherapeutic   Recent Labs      11/20/17   1050   INR  2.50*                          Plan:   Decrease Coumadin 2.5mg MF, 3.75mg TWTHSS  Recheck INR 1 weeks.   Patient reminded to call the

## 2017-11-28 ENCOUNTER — HOSPITAL ENCOUNTER (OUTPATIENT)
Dept: PHARMACY | Age: 77
Setting detail: THERAPIES SERIES
Discharge: HOME | End: 2017-11-28
Payer: MEDICARE

## 2017-11-28 DIAGNOSIS — I48.19 PERSISTENT ATRIAL FIBRILLATION (HCC): ICD-10-CM

## 2017-11-28 LAB — POC INR: 1.9 (ref 0.8–1.2)

## 2017-11-28 PROCEDURE — 36416 COLLJ CAPILLARY BLOOD SPEC: CPT

## 2017-11-28 PROCEDURE — 99211 OFF/OP EST MAY X REQ PHY/QHP: CPT

## 2017-11-28 PROCEDURE — 85610 PROTHROMBIN TIME: CPT

## 2017-11-28 ASSESSMENT — ENCOUNTER SYMPTOMS
BLOOD IN STOOL: 0
DIARRHEA: 0
CONSTIPATION: 0
SHORTNESS OF BREATH: 0

## 2017-12-01 ENCOUNTER — HOSPITAL ENCOUNTER (OUTPATIENT)
Dept: GENERAL RADIOLOGY | Age: 77
Discharge: HOME | End: 2017-12-01
Payer: MEDICARE

## 2017-12-01 ENCOUNTER — HOSPITAL ENCOUNTER (OUTPATIENT)
Age: 77
Discharge: HOME | End: 2017-12-01
Payer: MEDICARE

## 2017-12-01 DIAGNOSIS — M79.672 LEFT FOOT PAIN: ICD-10-CM

## 2017-12-01 DIAGNOSIS — M79.671 RIGHT FOOT PAIN: ICD-10-CM

## 2017-12-01 PROCEDURE — 73630 X-RAY EXAM OF FOOT: CPT

## 2017-12-14 ENCOUNTER — HOSPITAL ENCOUNTER (OUTPATIENT)
Dept: PHARMACY | Age: 77
Setting detail: THERAPIES SERIES
Discharge: HOME | End: 2017-12-14
Payer: MEDICARE

## 2017-12-14 DIAGNOSIS — I48.19 PERSISTENT ATRIAL FIBRILLATION (HCC): ICD-10-CM

## 2017-12-14 LAB — POC INR: 1.5 (ref 0.8–1.2)

## 2017-12-14 PROCEDURE — 99211 OFF/OP EST MAY X REQ PHY/QHP: CPT | Performed by: PHARMACIST

## 2017-12-14 PROCEDURE — 36416 COLLJ CAPILLARY BLOOD SPEC: CPT | Performed by: PHARMACIST

## 2017-12-14 PROCEDURE — 85610 PROTHROMBIN TIME: CPT | Performed by: PHARMACIST

## 2018-01-01 ENCOUNTER — HOSPITAL ENCOUNTER (OUTPATIENT)
Dept: PHARMACY | Age: 78
Setting detail: THERAPIES SERIES
Discharge: HOME | End: 2018-05-14
Payer: MEDICARE

## 2018-01-01 ENCOUNTER — HOSPITAL ENCOUNTER (EMERGENCY)
Age: 78
Discharge: HOME | End: 2018-03-06
Attending: EMERGENCY MEDICINE
Payer: MEDICARE

## 2018-01-01 ENCOUNTER — APPOINTMENT (OUTPATIENT)
Dept: CT IMAGING | Age: 78
End: 2018-01-01
Payer: MEDICARE

## 2018-01-01 ENCOUNTER — TELEPHONE (OUTPATIENT)
Dept: PHARMACY | Age: 78
End: 2018-01-01

## 2018-01-01 ENCOUNTER — NURSE ONLY (OUTPATIENT)
Dept: CARDIOLOGY CLINIC | Age: 78
End: 2018-01-01
Payer: MEDICARE

## 2018-01-01 ENCOUNTER — HOSPITAL ENCOUNTER (OUTPATIENT)
Dept: GENERAL RADIOLOGY | Age: 78
Discharge: HOME | End: 2018-04-10
Payer: MEDICARE

## 2018-01-01 ENCOUNTER — HOSPITAL ENCOUNTER (OUTPATIENT)
Dept: PHARMACY | Age: 78
Setting detail: THERAPIES SERIES
Discharge: HOME | End: 2018-06-26
Payer: MEDICARE

## 2018-01-01 ENCOUNTER — HOSPITAL ENCOUNTER (OUTPATIENT)
Dept: PHARMACY | Age: 78
Setting detail: THERAPIES SERIES
Discharge: HOME | End: 2018-04-09
Payer: MEDICARE

## 2018-01-01 ENCOUNTER — HOSPITAL ENCOUNTER (EMERGENCY)
Age: 78
Discharge: HOME | End: 2018-05-01
Attending: FAMILY MEDICINE
Payer: MEDICARE

## 2018-01-01 ENCOUNTER — HOSPITAL ENCOUNTER (OUTPATIENT)
Dept: PHARMACY | Age: 78
Setting detail: THERAPIES SERIES
Discharge: HOME | End: 2018-02-20
Payer: MEDICARE

## 2018-01-01 ENCOUNTER — APPOINTMENT (OUTPATIENT)
Dept: GENERAL RADIOLOGY | Age: 78
End: 2018-01-01
Payer: MEDICARE

## 2018-01-01 ENCOUNTER — HOSPITAL ENCOUNTER (OUTPATIENT)
Dept: CT IMAGING | Age: 78
Discharge: HOME | End: 2018-03-28
Payer: MEDICARE

## 2018-01-01 ENCOUNTER — HOSPITAL ENCOUNTER (OUTPATIENT)
Age: 78
Discharge: HOME | End: 2018-03-06
Payer: MEDICARE

## 2018-01-01 ENCOUNTER — HOSPITAL ENCOUNTER (OUTPATIENT)
Dept: PHARMACY | Age: 78
Setting detail: THERAPIES SERIES
Discharge: HOME | End: 2018-08-20
Payer: MEDICARE

## 2018-01-01 ENCOUNTER — HOSPITAL ENCOUNTER (OUTPATIENT)
Dept: PHARMACY | Age: 78
Setting detail: THERAPIES SERIES
Discharge: HOME | End: 2018-04-30
Payer: MEDICARE

## 2018-01-01 ENCOUNTER — HOSPITAL ENCOUNTER (OUTPATIENT)
Age: 78
Discharge: HOME | End: 2018-11-02
Payer: MEDICARE

## 2018-01-01 ENCOUNTER — OFFICE VISIT (OUTPATIENT)
Dept: CARDIOLOGY CLINIC | Age: 78
End: 2018-01-01
Payer: MEDICARE

## 2018-01-01 ENCOUNTER — PROCEDURE VISIT (OUTPATIENT)
Dept: CARDIOLOGY CLINIC | Age: 78
End: 2018-01-01
Payer: MEDICARE

## 2018-01-01 ENCOUNTER — HOSPITAL ENCOUNTER (OUTPATIENT)
Age: 78
Discharge: HOME | End: 2018-04-10
Payer: MEDICARE

## 2018-01-01 ENCOUNTER — HOSPITAL ENCOUNTER (OUTPATIENT)
Dept: PHARMACY | Age: 78
Setting detail: THERAPIES SERIES
Discharge: HOME | End: 2018-06-05
Payer: MEDICARE

## 2018-01-01 ENCOUNTER — HOSPITAL ENCOUNTER (OUTPATIENT)
Dept: INTERVENTIONAL RADIOLOGY/VASCULAR | Age: 78
Discharge: HOME | End: 2018-03-14
Payer: MEDICARE

## 2018-01-01 ENCOUNTER — HOSPITAL ENCOUNTER (OUTPATIENT)
Dept: PHARMACY | Age: 78
Setting detail: THERAPIES SERIES
Discharge: HOME | End: 2018-08-03
Payer: MEDICARE

## 2018-01-01 ENCOUNTER — TELEPHONE (OUTPATIENT)
Dept: CARDIOLOGY CLINIC | Age: 78
End: 2018-01-01

## 2018-01-01 ENCOUNTER — HOSPITAL ENCOUNTER (OUTPATIENT)
Dept: PHARMACY | Age: 78
Setting detail: THERAPIES SERIES
Discharge: HOME | End: 2018-07-06
Payer: MEDICARE

## 2018-01-01 ENCOUNTER — HOSPITAL ENCOUNTER (OUTPATIENT)
Dept: PHARMACY | Age: 78
Setting detail: THERAPIES SERIES
Discharge: HOME | End: 2018-12-12
Payer: MEDICARE

## 2018-01-01 ENCOUNTER — HOSPITAL ENCOUNTER (OUTPATIENT)
Dept: GENERAL RADIOLOGY | Age: 78
Discharge: HOME | End: 2018-03-06
Payer: MEDICARE

## 2018-01-01 ENCOUNTER — HOSPITAL ENCOUNTER (OUTPATIENT)
Dept: PHARMACY | Age: 78
Setting detail: THERAPIES SERIES
Discharge: HOME | End: 2018-11-14
Payer: MEDICARE

## 2018-01-01 ENCOUNTER — HOSPITAL ENCOUNTER (OUTPATIENT)
Dept: PHARMACY | Age: 78
Setting detail: THERAPIES SERIES
Discharge: HOME | End: 2018-03-26
Payer: MEDICARE

## 2018-01-01 ENCOUNTER — HOSPITAL ENCOUNTER (OUTPATIENT)
Age: 78
Discharge: HOME | End: 2018-11-15
Payer: MEDICARE

## 2018-01-01 ENCOUNTER — HOSPITAL ENCOUNTER (OUTPATIENT)
Dept: PHARMACY | Age: 78
Setting detail: THERAPIES SERIES
Discharge: HOME | End: 2018-09-10
Payer: MEDICARE

## 2018-01-01 ENCOUNTER — APPOINTMENT (OUTPATIENT)
Dept: PHARMACY | Age: 78
End: 2018-01-01
Payer: MEDICARE

## 2018-01-01 ENCOUNTER — HOSPITAL ENCOUNTER (OUTPATIENT)
Age: 78
Discharge: HOME | End: 2018-08-17
Payer: MEDICARE

## 2018-01-01 ENCOUNTER — HOSPITAL ENCOUNTER (OUTPATIENT)
Dept: PHARMACY | Age: 78
Setting detail: THERAPIES SERIES
Discharge: HOME | End: 2018-07-20
Payer: MEDICARE

## 2018-01-01 ENCOUNTER — HOSPITAL ENCOUNTER (OUTPATIENT)
Dept: PHARMACY | Age: 78
Setting detail: THERAPIES SERIES
Discharge: HOME | End: 2018-10-17
Payer: MEDICARE

## 2018-01-01 VITALS
RESPIRATION RATE: 18 BRPM | HEIGHT: 71 IN | BODY MASS INDEX: 20.3 KG/M2 | OXYGEN SATURATION: 95 % | HEART RATE: 55 BPM | SYSTOLIC BLOOD PRESSURE: 104 MMHG | WEIGHT: 145 LBS | DIASTOLIC BLOOD PRESSURE: 80 MMHG | TEMPERATURE: 98.6 F

## 2018-01-01 VITALS
HEIGHT: 66 IN | HEART RATE: 60 BPM | BODY MASS INDEX: 26.2 KG/M2 | WEIGHT: 163 LBS | SYSTOLIC BLOOD PRESSURE: 122 MMHG | DIASTOLIC BLOOD PRESSURE: 70 MMHG

## 2018-01-01 VITALS
OXYGEN SATURATION: 97 % | RESPIRATION RATE: 16 BRPM | BODY MASS INDEX: 22.05 KG/M2 | HEIGHT: 70 IN | WEIGHT: 154 LBS | SYSTOLIC BLOOD PRESSURE: 108 MMHG | HEART RATE: 62 BPM | DIASTOLIC BLOOD PRESSURE: 66 MMHG | TEMPERATURE: 100.6 F

## 2018-01-01 VITALS
BODY MASS INDEX: 21.76 KG/M2 | HEART RATE: 76 BPM | WEIGHT: 152 LBS | HEIGHT: 70 IN | DIASTOLIC BLOOD PRESSURE: 72 MMHG | SYSTOLIC BLOOD PRESSURE: 114 MMHG

## 2018-01-01 DIAGNOSIS — I82.409 DEEP VEIN THROMBOSIS (DVT) OF LOWER EXTREMITY, UNSPECIFIED CHRONICITY, UNSPECIFIED LATERALITY, UNSPECIFIED VEIN (HCC): ICD-10-CM

## 2018-01-01 DIAGNOSIS — Z86.711 HISTORY OF PULMONARY EMBOLISM: ICD-10-CM

## 2018-01-01 DIAGNOSIS — R42 DIZZINESS: ICD-10-CM

## 2018-01-01 DIAGNOSIS — E78.5 DYSLIPIDEMIA, GOAL LDL BELOW 100: ICD-10-CM

## 2018-01-01 DIAGNOSIS — R50.9 INTERMITTENT FEVER OF UNKNOWN ORIGIN: Primary | ICD-10-CM

## 2018-01-01 DIAGNOSIS — I48.19 PERSISTENT ATRIAL FIBRILLATION (HCC): ICD-10-CM

## 2018-01-01 DIAGNOSIS — Z95.0 PACEMAKER: Primary | ICD-10-CM

## 2018-01-01 DIAGNOSIS — I82.4Y9 DEEP VEIN THROMBOSIS (DVT) OF PROXIMAL LOWER EXTREMITY, UNSPECIFIED CHRONICITY, UNSPECIFIED LATERALITY (HCC): ICD-10-CM

## 2018-01-01 DIAGNOSIS — I71.20 THORACIC AORTIC ANEURYSM WITHOUT RUPTURE: ICD-10-CM

## 2018-01-01 DIAGNOSIS — E11.8 TYPE 2 DIABETES MELLITUS WITH COMPLICATION, UNSPECIFIED LONG TERM INSULIN USE STATUS: ICD-10-CM

## 2018-01-01 DIAGNOSIS — Z01.818 PRE-OPERATIVE CLEARANCE: Primary | ICD-10-CM

## 2018-01-01 DIAGNOSIS — I48.0 PAF (PAROXYSMAL ATRIAL FIBRILLATION) (HCC): ICD-10-CM

## 2018-01-01 DIAGNOSIS — M25.511 RIGHT SHOULDER PAIN, UNSPECIFIED CHRONICITY: ICD-10-CM

## 2018-01-01 DIAGNOSIS — R31.9 HEMATURIA, UNSPECIFIED TYPE: ICD-10-CM

## 2018-01-01 DIAGNOSIS — E83.42 HYPOMAGNESEMIA: ICD-10-CM
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
## 2018-01-01 DIAGNOSIS — R50.9 FEVER, UNSPECIFIED FEVER CAUSE: ICD-10-CM

## 2018-01-01 DIAGNOSIS — I77.9 BILATERAL CAROTID ARTERY DISEASE (HCC): ICD-10-CM

## 2018-01-01 DIAGNOSIS — I10 HYPERTENSION GOAL BP (BLOOD PRESSURE) < 130/80: ICD-10-CM

## 2018-01-01 DIAGNOSIS — S01.81XA FACIAL LACERATION, INITIAL ENCOUNTER: Primary | ICD-10-CM

## 2018-01-01 LAB
ADENOVIRUS F 40 41 PCR: NOT DETECTED
ALBUMIN SERPL-MCNC: 3 GM/DL (ref 3.4–5)
ALP BLD-CCNC: 65 U/L (ref 46–116)
ALT SERPL-CCNC: 18 U/L (ref 14–63)
AMORPHOUS: ABNORMAL
AMORPHOUS: ABNORMAL
ANION GAP: 11 MEQ/L (ref 8–16)
ANION GAP: 8 MEQ/L (ref 8–16)
AST SERPL-CCNC: 20 U/L (ref 15–37)
ASTROVIRUS PCR: NOT DETECTED
BACTERIA: ABNORMAL
BACTERIA: ABNORMAL
BASOPHILS # BLD: 0.3 % (ref 0–3)
BASOPHILS # BLD: 0.3 % (ref 0–3)
BILIRUB SERPL-MCNC: 0.8 MG/DL (ref 0.2–1)
BILIRUBIN URINE: NEGATIVE
BILIRUBIN URINE: NEGATIVE
BLOOD CULTURE, ROUTINE: NORMAL
BLOOD, URINE: ABNORMAL
BLOOD, URINE: ABNORMAL
BUN BLDV-MCNC: 14 MG/DL (ref 7–18)
BUN BLDV-MCNC: 23 MG/DL (ref 7–18)
CAMPYLOBACTER PCR: NOT DETECTED
CASTS UA: ABNORMAL /LPF
CASTS UA: ABNORMAL /LPF
CHARACTER, URINE: CLEAR
CHARACTER, URINE: CLEAR
CHLORIDE BLD-SCNC: 101 MEQ/L (ref 98–107)
CHLORIDE BLD-SCNC: 102 MEQ/L (ref 98–107)
CLOSTRIDIUM DIFFICILE, PCR: NOT DETECTED
CO2: 25 MEQ/L (ref 21–32)
CO2: 29 MEQ/L (ref 21–32)
COLOR: YELLOW
COLOR: YELLOW
CREAT SERPL-MCNC: 1.1 MG/DL (ref 0.6–1.3)
CREAT SERPL-MCNC: 1.2 MG/DL (ref 0.6–1.3)
CRYPTOSPORIDIUM PCR: NOT DETECTED
CRYSTALS, UA: ABNORMAL
CRYSTALS, UA: ABNORMAL
CYCLOSPORA CAYETANENSIS PCR: NOT DETECTED
E COLI 0157 PCR: NORMAL
E COLI ENTEROAGGREGATIVE PCR: NOT DETECTED
E COLI ENTEROPATHOGENIC PCR: NOT DETECTED
E COLI ENTEROTOXIGENIC PCR: NOT DETECTED
E COLI SHIGA LIKE TOXIN PCR: NOT DETECTED
E COLI SHIGELLA/ENTEROINVASIVE PCR: NOT DETECTED
E HISTOLYTICA GI FILM ARRAY: NOT DETECTED
EKG ATRIAL RATE: 90 BPM
EKG P AXIS: -2 DEGREES
EKG P-R INTERVAL: 226 MS
EKG Q-T INTERVAL: 368 MS
EKG QRS DURATION: 90 MS
EKG QTC CALCULATION (BAZETT): 450 MS
EKG R AXIS: -38 DEGREES
EKG T AXIS: 4 DEGREES
EKG VENTRICULAR RATE: 90 BPM
EOSINOPHILS RELATIVE PERCENT: 1.4 % (ref 0–4)
EOSINOPHILS RELATIVE PERCENT: 1.9 % (ref 0–4)
EPITHELIAL CELLS, UA: ABNORMAL /HPF
EPITHELIAL CELLS, UA: ABNORMAL /HPF
FLU A ANTIGEN: NEGATIVE
FLU B ANTIGEN: NEGATIVE
GFR, ESTIMATED: 62 ML/MIN/1.73M2
GFR, ESTIMATED: 69 ML/MIN/1.73M2
GIARDIA LAMBLIA PCR: NOT DETECTED
GLUCOSE BLD-MCNC: 109 MG/DL (ref 74–106)
GLUCOSE BLD-MCNC: 134 MG/DL (ref 74–106)
GLUCOSE, URINE: NEGATIVE MG/DL
GLUCOSE, URINE: NEGATIVE MG/DL
HCT VFR BLD CALC: 33.7 % (ref 42–52)
HCT VFR BLD CALC: 38 % (ref 42–52)
HCT VFR BLD CALC: 45.2 % (ref 42–52)
HEMOGLOBIN: 11.1 GM/DL (ref 14–18)
HEMOGLOBIN: 12.8 GM/DL (ref 14–18)
HEMOGLOBIN: 14.3 GM/DL (ref 14–18)
INR BLD: 1.98 (ref 0.85–1.13)
INR BLD: 2.24 (ref 0.85–1.13)
KETONES, URINE: NEGATIVE
KETONES, URINE: NEGATIVE
LACTATE: 0.75 MMOL/L (ref 0.9–1.7)
LEUKOCYTE ESTERASE, URINE: ABNORMAL
LEUKOCYTE ESTERASE, URINE: NEGATIVE
LYMPHOCYTES # BLD: 11.1 % (ref 15–47)
LYMPHOCYTES # BLD: 9.7 % (ref 15–47)
MAGNESIUM: 1.5 MG/DL (ref 1.8–2.4)
MCH RBC QN AUTO: 27.2 PG (ref 27–31)
MCH RBC QN AUTO: 29.3 PG (ref 27–31)
MCHC RBC AUTO-ENTMCNC: 32.8 GM/DL (ref 33–37)
MCHC RBC AUTO-ENTMCNC: 33.7 GM/DL (ref 33–37)
MCV RBC AUTO: 82.9 FL (ref 80–94)
MCV RBC AUTO: 87.1 FL (ref 80–94)
MONOCYTES: 12.9 % (ref 0–12)
MONOCYTES: 7.8 % (ref 0–12)
MUCUS: ABNORMAL
MUCUS: ABNORMAL
NITRITE, URINE: NEGATIVE
NITRITE, URINE: NEGATIVE
NOROVIRUS GI GII PCR: NOT DETECTED
ORGANISM: ABNORMAL
PDW BLD-RTO: 12.1 % (ref 11.5–14.5)
PDW BLD-RTO: 14.4 % (ref 11.5–14.5)
PH UA: 7 (ref 5–9)
PH UA: 7.5 (ref 5–9)
PLATELET # BLD: 211 THOU/MM3 (ref 130–400)
PLATELET # BLD: 242 THOU/MM3 (ref 130–400)
PLESIOMONAS SHIGELLOIDES PCR: NOT DETECTED
PMV BLD AUTO: 7.3 FL (ref 7.4–10.4)
PMV BLD AUTO: 7.6 FL (ref 7.4–10.4)
POC CALCIUM: 9.3 MG/DL (ref 8.5–10.1)
POC CALCIUM: 9.5 MG/DL (ref 8.5–10.1)
POC INR: 1.2 (ref 0.8–1.2)
POC INR: 1.5 (ref 0.8–1.2)
POC INR: 1.6 (ref 0.8–1.2)
POC INR: 1.6 (ref 0.8–1.2)
POC INR: 1.7 (ref 0.8–1.2)
POC INR: 1.9 (ref 0.8–1.2)
POC INR: 2 (ref 0.8–1.2)
POC INR: 2.2 (ref 0.8–1.2)
POC INR: 2.2 (ref 0.8–1.2)
POC INR: 2.3 (ref 0.8–1.2)
POC INR: 2.5 (ref 0.8–1.2)
POC INR: 3.1 (ref 0.8–1.2)
POC INR: 3.3 (ref 0.8–1.2)
POTASSIUM SERPL-SCNC: 3.7 MEQ/L (ref 3.5–5.1)
POTASSIUM SERPL-SCNC: 4 MEQ/L (ref 3.5–5.1)
PROTEIN UA: 100 MG/DL
PROTEIN UA: ABNORMAL MG/DL
RBC # BLD: 4.06 MILL/MM3 (ref 4.7–6.1)
RBC # BLD: 4.36 MILL/MM3 (ref 4.7–6.1)
RBC UA: ABNORMAL /HPF
RBC UA: ABNORMAL /HPF
REFLEX TO URINE C & S: ABNORMAL
REFLEX TO URINE C & S: ABNORMAL
ROTAVIRUS A PCR: NOT DETECTED
SALMONELLA PCR: NOT DETECTED
SAPOVIRUS PCR: NOT DETECTED
SEGS: 74.3 % (ref 43–75)
SEGS: 80.3 % (ref 43–75)
SODIUM BLD-SCNC: 138 MEQ/L (ref 136–145)
SODIUM BLD-SCNC: 138 MEQ/L (ref 136–145)
SPECIFIC GRAVITY UA: 1.02 (ref 1–1.03)
SPECIFIC GRAVITY UA: 1.02 (ref 1–1.03)
TOTAL PROTEIN: 6.8 GM/DL (ref 6.4–8.2)
TROPONIN I: < 0.017 NG/ML (ref 0.02–0.05)
URINE CULTURE REFLEX: ABNORMAL
URINE CULTURE, ROUTINE: NORMAL
UROBILINOGEN, URINE: 0.2 EU/DL (ref 0–1)
UROBILINOGEN, URINE: 1 EU/DL (ref 0–1)
VIBRIO CHOLERAE PCR: NOT DETECTED
VIBRIO PCR: NOT DETECTED
WBC # BLD: 6.9 THOU/MM3 (ref 4.8–10.8)
WBC # BLD: 8.1 THOU/MM3 (ref 4.8–10.8)
WBC UA: ABNORMAL /HPF
WBC UA: ABNORMAL /HPF
YERSINIA ENTEROCOLITICA PCR: NOT DETECTED

## 2018-01-01 PROCEDURE — 1036F TOBACCO NON-USER: CPT | Performed by: PHYSICIAN ASSISTANT

## 2018-01-01 PROCEDURE — 70450 CT HEAD/BRAIN W/O DYE: CPT

## 2018-01-01 PROCEDURE — 36416 COLLJ CAPILLARY BLOOD SPEC: CPT

## 2018-01-01 PROCEDURE — 85610 PROTHROMBIN TIME: CPT

## 2018-01-01 PROCEDURE — 99285 EMERGENCY DEPT VISIT HI MDM: CPT

## 2018-01-01 PROCEDURE — 36416 COLLJ CAPILLARY BLOOD SPEC: CPT | Performed by: PHARMACIST

## 2018-01-01 PROCEDURE — 99211 OFF/OP EST MAY X REQ PHY/QHP: CPT

## 2018-01-01 PROCEDURE — 87040 BLOOD CULTURE FOR BACTERIA: CPT

## 2018-01-01 PROCEDURE — 84484 ASSAY OF TROPONIN QUANT: CPT

## 2018-01-01 PROCEDURE — 99213 OFFICE O/P EST LOW 20 MIN: CPT | Performed by: PHYSICIAN ASSISTANT

## 2018-01-01 PROCEDURE — 36415 COLL VENOUS BLD VENIPUNCTURE: CPT

## 2018-01-01 PROCEDURE — 4040F PNEUMOC VAC/ADMIN/RCVD: CPT | Performed by: INTERNAL MEDICINE

## 2018-01-01 PROCEDURE — 99211 OFF/OP EST MAY X REQ PHY/QHP: CPT | Performed by: PHARMACIST

## 2018-01-01 PROCEDURE — G8428 CUR MEDS NOT DOCUMENT: HCPCS | Performed by: INTERNAL MEDICINE

## 2018-01-01 PROCEDURE — 72125 CT NECK SPINE W/O DYE: CPT

## 2018-01-01 PROCEDURE — 93880 EXTRACRANIAL BILAT STUDY: CPT

## 2018-01-01 PROCEDURE — 2500000003 HC RX 250 WO HCPCS: Performed by: FAMILY MEDICINE

## 2018-01-01 PROCEDURE — G8420 CALC BMI NORM PARAMETERS: HCPCS | Performed by: INTERNAL MEDICINE

## 2018-01-01 PROCEDURE — 93280 PM DEVICE PROGR EVAL DUAL: CPT | Performed by: INTERNAL MEDICINE

## 2018-01-01 PROCEDURE — 85610 PROTHROMBIN TIME: CPT | Performed by: PHARMACIST

## 2018-01-01 PROCEDURE — G8482 FLU IMMUNIZE ORDER/ADMIN: HCPCS | Performed by: INTERNAL MEDICINE

## 2018-01-01 PROCEDURE — 87804 INFLUENZA ASSAY W/OPTIC: CPT

## 2018-01-01 PROCEDURE — 71046 X-RAY EXAM CHEST 2 VIEWS: CPT

## 2018-01-01 PROCEDURE — 73030 X-RAY EXAM OF SHOULDER: CPT

## 2018-01-01 PROCEDURE — 80053 COMPREHEN METABOLIC PANEL: CPT

## 2018-01-01 PROCEDURE — 81001 URINALYSIS AUTO W/SCOPE: CPT

## 2018-01-01 PROCEDURE — 1123F ACP DISCUSS/DSCN MKR DOCD: CPT | Performed by: PHYSICIAN ASSISTANT

## 2018-01-01 PROCEDURE — 83735 ASSAY OF MAGNESIUM: CPT

## 2018-01-01 PROCEDURE — 85025 COMPLETE CBC W/AUTO DIFF WBC: CPT

## 2018-01-01 PROCEDURE — 74174 CTA ABD&PLVS W/CONTRAST: CPT

## 2018-01-01 PROCEDURE — 85014 HEMATOCRIT: CPT

## 2018-01-01 PROCEDURE — 1101F PT FALLS ASSESS-DOCD LE1/YR: CPT | Performed by: PHYSICIAN ASSISTANT

## 2018-01-01 PROCEDURE — 80048 BASIC METABOLIC PNL TOTAL CA: CPT

## 2018-01-01 PROCEDURE — 6370000000 HC RX 637 (ALT 250 FOR IP): Performed by: FAMILY MEDICINE

## 2018-01-01 PROCEDURE — 93010 ELECTROCARDIOGRAM REPORT: CPT | Performed by: INTERNAL MEDICINE

## 2018-01-01 PROCEDURE — G8427 DOCREV CUR MEDS BY ELIG CLIN: HCPCS | Performed by: PHYSICIAN ASSISTANT

## 2018-01-01 PROCEDURE — 85018 HEMOGLOBIN: CPT

## 2018-01-01 PROCEDURE — 93005 ELECTROCARDIOGRAM TRACING: CPT | Performed by: EMERGENCY MEDICINE

## 2018-01-01 PROCEDURE — 71275 CT ANGIOGRAPHY CHEST: CPT

## 2018-01-01 PROCEDURE — 6360000002 HC RX W HCPCS: Performed by: FAMILY MEDICINE

## 2018-01-01 PROCEDURE — 87086 URINE CULTURE/COLONY COUNT: CPT

## 2018-01-01 PROCEDURE — 87507 IADNA-DNA/RNA PROBE TQ 12-25: CPT

## 2018-01-01 PROCEDURE — 6360000004 HC RX CONTRAST MEDICATION: Performed by: INTERNAL MEDICINE

## 2018-01-01 PROCEDURE — 90471 IMMUNIZATION ADMIN: CPT | Performed by: FAMILY MEDICINE

## 2018-01-01 PROCEDURE — G8419 CALC BMI OUT NRM PARAM NOF/U: HCPCS | Performed by: PHYSICIAN ASSISTANT

## 2018-01-01 PROCEDURE — 99284 EMERGENCY DEPT VISIT MOD MDM: CPT

## 2018-01-01 PROCEDURE — 1036F TOBACCO NON-USER: CPT | Performed by: INTERNAL MEDICINE

## 2018-01-01 PROCEDURE — 99214 OFFICE O/P EST MOD 30 MIN: CPT | Performed by: INTERNAL MEDICINE

## 2018-01-01 PROCEDURE — 90715 TDAP VACCINE 7 YRS/> IM: CPT | Performed by: FAMILY MEDICINE

## 2018-01-01 PROCEDURE — A6402 STERILE GAUZE <= 16 SQ IN: HCPCS

## 2018-01-01 PROCEDURE — 4040F PNEUMOC VAC/ADMIN/RCVD: CPT | Performed by: PHYSICIAN ASSISTANT

## 2018-01-01 PROCEDURE — 12011 RPR F/E/E/N/L/M 2.5 CM/<: CPT

## 2018-01-01 PROCEDURE — 83605 ASSAY OF LACTIC ACID: CPT

## 2018-01-01 PROCEDURE — 51701 INSERT BLADDER CATHETER: CPT

## 2018-01-01 PROCEDURE — 1123F ACP DISCUSS/DSCN MKR DOCD: CPT | Performed by: INTERNAL MEDICINE

## 2018-01-01 RX ORDER — WARFARIN SODIUM 5 MG/1
5 TABLET ORAL
COMMUNITY
End: 2018-01-01 | Stop reason: SDUPTHER

## 2018-01-01 RX ORDER — LIDOCAINE HYDROCHLORIDE 20 MG/ML
5 INJECTION, SOLUTION INFILTRATION; PERINEURAL ONCE
Status: COMPLETED | OUTPATIENT
Start: 2018-01-01 | End: 2018-01-01

## 2018-01-01 RX ORDER — WARFARIN SODIUM 5 MG/1
TABLET ORAL
Qty: 120 TABLET | Refills: 3 | Status: SHIPPED | OUTPATIENT
Start: 2018-01-01

## 2018-01-01 RX ORDER — WARFARIN SODIUM 2.5 MG/1
2.5 TABLET ORAL
COMMUNITY
End: 2018-01-01 | Stop reason: SDUPTHER

## 2018-01-01 RX ORDER — LEVOFLOXACIN 500 MG/1
500 TABLET, FILM COATED ORAL DAILY
Qty: 10 TABLET | Refills: 0 | Status: SHIPPED | OUTPATIENT
Start: 2018-01-01 | End: 2018-01-01

## 2018-01-01 RX ORDER — DONEPEZIL HYDROCHLORIDE 5 MG/1
TABLET, FILM COATED ORAL
Refills: 0 | COMMUNITY
Start: 2018-02-01

## 2018-01-01 RX ORDER — WARFARIN SODIUM 2.5 MG/1
TABLET ORAL
Qty: 30 TABLET | Refills: 11 | Status: SHIPPED | OUTPATIENT
Start: 2018-01-01

## 2018-01-01 RX ORDER — NITROFURANTOIN 25; 75 MG/1; MG/1
100 CAPSULE ORAL 2 TIMES DAILY
Qty: 20 CAPSULE | Refills: 0 | Status: SHIPPED | OUTPATIENT
Start: 2018-01-01 | End: 2018-01-01

## 2018-01-01 RX ORDER — LEVOFLOXACIN 500 MG/1
500 TABLET, FILM COATED ORAL ONCE
Status: COMPLETED | OUTPATIENT
Start: 2018-01-01 | End: 2018-01-01

## 2018-01-01 RX ORDER — LANOLIN ALCOHOL/MO/W.PET/CERES
3 CREAM (GRAM) TOPICAL NIGHTLY
COMMUNITY

## 2018-01-01 RX ORDER — SOLIFENACIN SUCCINATE 10 MG/1
10 TABLET, FILM COATED ORAL DAILY
Refills: 0 | COMMUNITY
Start: 2018-02-12 | End: 2018-01-01 | Stop reason: ALTCHOICE

## 2018-01-01 RX ORDER — BACITRACIN, NEOMYCIN, POLYMYXIN B 400; 3.5; 5 [USP'U]/G; MG/G; [USP'U]/G
OINTMENT TOPICAL ONCE
Status: COMPLETED | OUTPATIENT
Start: 2018-01-01 | End: 2018-01-01

## 2018-01-01 RX ADMIN — LIDOCAINE HYDROCHLORIDE 5 ML: 20 INJECTION, SOLUTION INFILTRATION; PERINEURAL at 12:10

## 2018-01-01 RX ADMIN — BACITRACIN, NEOMYCIN, POLYMYXIN B 3.5 G: 400; 3.5; 5 OINTMENT TOPICAL at 12:09

## 2018-01-01 RX ADMIN — IOPAMIDOL 100 ML: 755 INJECTION, SOLUTION INTRAVENOUS at 13:27

## 2018-01-01 RX ADMIN — LEVOFLOXACIN 500 MG: 500 TABLET, FILM COATED ORAL at 13:20

## 2018-01-01 RX ADMIN — TETANUS TOXOID, REDUCED DIPHTHERIA TOXOID AND ACELLULAR PERTUSSIS VACCINE, ADSORBED 0.5 ML: 5; 2.5; 8; 8; 2.5 SUSPENSION INTRAMUSCULAR at 12:08

## 2018-01-01 ASSESSMENT — ENCOUNTER SYMPTOMS
COUGH: 0
EYE DISCHARGE: 0
VOMITING: 0
PHOTOPHOBIA: 0
EYE REDNESS: 0
EYE PAIN: 0
RHINORRHEA: 0
ABDOMINAL PAIN: 0
NAUSEA: 0
SHORTNESS OF BREATH: 0
STRIDOR: 0
SINUS PRESSURE: 0
ABDOMINAL DISTENTION: 0
CONSTIPATION: 0
SPUTUM PRODUCTION: 0
BACK PAIN: 0
CONSTIPATION: 0
SORE THROAT: 0
CHEST TIGHTNESS: 0
WHEEZING: 0
BACK PAIN: 0
DIARRHEA: 0
ABDOMINAL PAIN: 0
DIARRHEA: 0
WHEEZING: 0
SORE THROAT: 0
SHORTNESS OF BREATH: 0
COUGH: 0
NAUSEA: 0

## 2018-01-01 ASSESSMENT — PAIN DESCRIPTION - DESCRIPTORS: DESCRIPTORS: ACHING

## 2018-01-01 ASSESSMENT — PAIN DESCRIPTION - PAIN TYPE: TYPE: ACUTE PAIN

## 2018-01-01 ASSESSMENT — PAIN SCALES - GENERAL: PAINLEVEL_OUTOF10: 4

## 2018-01-01 ASSESSMENT — PAIN DESCRIPTION - LOCATION
LOCATION: MOUTH
LOCATION: SHOULDER

## 2018-01-01 ASSESSMENT — PAIN DESCRIPTION - ORIENTATION: ORIENTATION: LEFT;RIGHT

## 2018-01-02 ENCOUNTER — HOSPITAL ENCOUNTER (OUTPATIENT)
Dept: PHARMACY | Age: 78
Setting detail: THERAPIES SERIES
Discharge: HOME | End: 2018-01-02
Payer: MEDICARE

## 2018-01-02 DIAGNOSIS — I48.19 PERSISTENT ATRIAL FIBRILLATION (HCC): ICD-10-CM

## 2018-01-02 PROBLEM — Z91.89: Status: ACTIVE | Noted: 2017-05-11

## 2018-01-02 PROBLEM — C34.12 PRIMARY MALIGNANT NEOPLASM OF LEFT UPPER LOBE OF LUNG (HCC): Status: ACTIVE | Noted: 2017-08-01

## 2018-01-02 LAB — POC INR: 3.8 (ref 0.8–1.2)

## 2018-01-02 PROCEDURE — 85610 PROTHROMBIN TIME: CPT

## 2018-01-02 PROCEDURE — 36416 COLLJ CAPILLARY BLOOD SPEC: CPT

## 2018-01-02 PROCEDURE — 99211 OFF/OP EST MAY X REQ PHY/QHP: CPT

## 2018-01-10 ENCOUNTER — TELEPHONE (OUTPATIENT)
Dept: PHARMACY | Age: 78
End: 2018-01-10

## 2018-01-15 ENCOUNTER — HOSPITAL ENCOUNTER (OUTPATIENT)
Dept: PHARMACY | Age: 78
Setting detail: THERAPIES SERIES
Discharge: HOME | End: 2018-01-15
Payer: MEDICARE

## 2018-01-15 DIAGNOSIS — I48.19 PERSISTENT ATRIAL FIBRILLATION (HCC): ICD-10-CM

## 2018-01-15 DIAGNOSIS — Z86.711 HISTORY OF PULMONARY EMBOLISM: ICD-10-CM

## 2018-01-15 LAB — POC INR: 2.6 (ref 0.8–1.2)

## 2018-01-15 PROCEDURE — 36416 COLLJ CAPILLARY BLOOD SPEC: CPT

## 2018-01-15 PROCEDURE — 85610 PROTHROMBIN TIME: CPT

## 2018-01-15 PROCEDURE — 99211 OFF/OP EST MAY X REQ PHY/QHP: CPT

## 2018-01-15 RX ORDER — TIZANIDINE 2 MG/1
TABLET ORAL
Refills: 0 | COMMUNITY
Start: 2017-12-12 | End: 2018-01-01

## 2018-01-25 ENCOUNTER — HOSPITAL ENCOUNTER (OUTPATIENT)
Dept: CT IMAGING | Age: 78
Discharge: HOME | End: 2018-01-25
Payer: MEDICARE

## 2018-01-25 DIAGNOSIS — H53.139 SUDDEN VISUAL LOSS, UNSPECIFIED LATERALITY: ICD-10-CM

## 2018-01-25 LAB — POC CREATININE WHOLE BLOOD: 1 MG/DL (ref 0.5–1.2)

## 2018-01-25 PROCEDURE — 70482 CT ORBIT/EAR/FOSSA W/O&W/DYE: CPT

## 2018-01-25 PROCEDURE — 82565 ASSAY OF CREATININE: CPT

## 2018-01-25 PROCEDURE — 70470 CT HEAD/BRAIN W/O & W/DYE: CPT

## 2018-01-25 PROCEDURE — 6360000004 HC RX CONTRAST MEDICATION: Performed by: OPHTHALMOLOGY

## 2018-01-25 RX ADMIN — IOPAMIDOL 70 ML: 755 INJECTION, SOLUTION INTRAVENOUS at 12:36

## 2018-01-29 ENCOUNTER — HOSPITAL ENCOUNTER (OUTPATIENT)
Dept: PHARMACY | Age: 78
Setting detail: THERAPIES SERIES
Discharge: HOME | End: 2018-01-29
Payer: MEDICARE

## 2018-01-29 DIAGNOSIS — Z86.711 HISTORY OF PULMONARY EMBOLISM: ICD-10-CM

## 2018-01-29 DIAGNOSIS — I48.19 PERSISTENT ATRIAL FIBRILLATION (HCC): ICD-10-CM

## 2018-01-29 LAB — POC INR: 2.4 (ref 0.8–1.2)

## 2018-01-29 PROCEDURE — 99211 OFF/OP EST MAY X REQ PHY/QHP: CPT

## 2018-01-29 PROCEDURE — 36416 COLLJ CAPILLARY BLOOD SPEC: CPT

## 2018-01-29 PROCEDURE — 85610 PROTHROMBIN TIME: CPT

## 2018-01-29 NOTE — PROGRESS NOTES
The [address] [facility]  Anticoagulation Clinic  [phone] (phone)  [phone] (fax)    Mr. Rachelle Benítez is a 66 y.o.  male with history of persistent atrial fib./history of PE/DVT, per Dr. Chucky Madden referral, who presents today for Warfarin monitoring and adjustment (2 week visit). Patient states he has light green tablets and peach tablets - using both. Follows printed instructions for dose. No missed or extra doses. Patient denies s/s bleeding/bruising/swelling/SOB/chest pain  No blood in urine or stool. No dietary changes. No changes in medication/OTC agents/herbals. No change in alcohol use or tobacco use. No change in activity level. Patient denies headaches/dizziness/lightheadedness/falls. No vomiting/diarrhea or acute illness. No procedures scheduled in the future at this time. Lab Results   Component Value Date    INR 2.40 (H) 01/29/2018    INR 2.60 (H) 01/15/2018    INR 3.80 (H) 01/02/2018     INR therapeutic - goal 2-3. Recent Labs      01/29/18   1347   INR  2.40*       Plan: POCT INR ordered/performed/result reviewed. Continue PO Coumadin 2.5 mg MF, 3.75 mg TWThSS. Recheck INR in 3 weeks. Advised patient, again, to only use 2.5 mg tablets (green) as instructions say - set 5 mg tablets aside. Patient reminded to call the Anticoagulation Clinic with any signs or symptoms of bleeding or with any medication changes. Patient given instructions utilizing the teach back method. Discharged ambulatory in no apparent distress. After visit summary printed and reviewed with patient.       Medications reviewed and updated on home medication list Yes    Influenza vaccine:     [] given    [] declined   [x] received previously   [] plans to receive at a later time   [] refused    [x] documented in EPIC

## 2018-01-31 ENCOUNTER — APPOINTMENT (OUTPATIENT)
Dept: CT IMAGING | Age: 78
End: 2018-01-31
Payer: MEDICARE

## 2018-01-31 ENCOUNTER — HOSPITAL ENCOUNTER (EMERGENCY)
Age: 78
Discharge: HOME | End: 2018-01-31
Attending: EMERGENCY MEDICINE
Payer: MEDICARE

## 2018-01-31 VITALS
HEART RATE: 65 BPM | BODY MASS INDEX: 20.67 KG/M2 | WEIGHT: 140 LBS | DIASTOLIC BLOOD PRESSURE: 74 MMHG | TEMPERATURE: 102 F | SYSTOLIC BLOOD PRESSURE: 127 MMHG | RESPIRATION RATE: 15 BRPM | OXYGEN SATURATION: 94 %

## 2018-01-31 DIAGNOSIS — R41.82 ALTERED MENTAL STATUS, UNSPECIFIED ALTERED MENTAL STATUS TYPE: Primary | ICD-10-CM

## 2018-01-31 DIAGNOSIS — N30.00 ACUTE CYSTITIS WITHOUT HEMATURIA: ICD-10-CM

## 2018-01-31 LAB
ALBUMIN SERPL-MCNC: 3.4 GM/DL (ref 3.5–5)
ALP BLD-CCNC: 64 U/L (ref 46–116)
ALT SERPL-CCNC: 15 U/L (ref 12–78)
AMMONIA: 18 UMOL/L (ref 11–60)
AMORPHOUS: ABNORMAL
ANION GAP: 10 MEQ/L (ref 8–16)
AST SERPL-CCNC: 18 U/L (ref 15–37)
BACTERIA: ABNORMAL
BASOPHILS # BLD: 0.4 % (ref 0–3)
BILIRUB SERPL-MCNC: 0.9 MG/DL (ref 0.2–1)
BILIRUBIN URINE: ABNORMAL
BLOOD, URINE: ABNORMAL
BUN BLDV-MCNC: 18 MG/DL (ref 7–18)
CASTS UA: ABNORMAL /LPF
CHARACTER, URINE: CLEAR
CHLORIDE BLD-SCNC: 100 MEQ/L (ref 98–107)
CO2: 27 MEQ/L (ref 21–32)
COLOR: ABNORMAL
CREAT SERPL-MCNC: 1.2 MG/DL (ref 0.8–1.3)
CRYSTALS, UA: ABNORMAL
EKG ATRIAL RATE: 65 BPM
EKG Q-T INTERVAL: 398 MS
EKG QRS DURATION: 88 MS
EKG QTC CALCULATION (BAZETT): 407 MS
EKG R AXIS: -28 DEGREES
EKG T AXIS: 37 DEGREES
EKG VENTRICULAR RATE: 63 BPM
EOSINOPHILS RELATIVE PERCENT: 2 % (ref 0–4)
EPITHELIAL CELLS, UA: ABNORMAL /HPF
FLU A ANTIGEN: NEGATIVE
FLU B ANTIGEN: NEGATIVE
GFR, ESTIMATED: 62 ML/MIN/1.73M2
GLUCOSE BLD-MCNC: 100 MG/DL (ref 74–106)
GLUCOSE, URINE: NEGATIVE MG/DL
HCT VFR BLD CALC: 40.3 % (ref 42–52)
HEMOGLOBIN: 13.7 GM/DL (ref 14–18)
ICTOTEST: NEGATIVE
INR BLD: 2.98 (ref 0.85–1.13)
KETONES, URINE: ABNORMAL
LACTATE: 1.8 MMOL/L (ref 0.9–1.7)
LEUKOCYTE ESTERASE, URINE: ABNORMAL
LYMPHOCYTES # BLD: 13.9 % (ref 15–47)
MCH RBC QN AUTO: 30.3 PG (ref 27–31)
MCHC RBC AUTO-ENTMCNC: 33.9 GM/DL (ref 33–37)
MCV RBC AUTO: 89.4 FL (ref 80–94)
MONOCYTES: 12.2 % (ref 0–12)
MUCUS: ABNORMAL
NITRITE, URINE: NEGATIVE
PDW BLD-RTO: 11.8 % (ref 11.5–14.5)
PH UA: 6.5 (ref 5–9)
PLATELET # BLD: 190 THOU/MM3 (ref 130–400)
PMV BLD AUTO: 8.1 FL (ref 7.4–10.4)
POC CALCIUM: 9.5 MG/DL (ref 8.5–10.1)
POTASSIUM SERPL-SCNC: 4.2 MEQ/L (ref 3.5–5.1)
PROTEIN UA: NEGATIVE MG/DL
RBC # BLD: 4.51 MILL/MM3 (ref 4.7–6.1)
RBC UA: ABNORMAL /HPF
REFLEX TO URINE C & S: ABNORMAL
SEGS: 71.5 % (ref 43–75)
SODIUM BLD-SCNC: 137 MEQ/L (ref 136–145)
SPECIFIC GRAVITY UA: 1.02 (ref 1–1.03)
TOTAL PROTEIN: 7.6 GM/DL (ref 6.4–8.2)
TROPONIN I: < 0.04 NG/ML
UROBILINOGEN, URINE: 4 EU/DL (ref 0–1)
WBC # BLD: 6.8 THOU/MM3 (ref 4.8–10.8)
WBC UA: ABNORMAL /HPF

## 2018-01-31 PROCEDURE — 2580000003 HC RX 258: Performed by: EMERGENCY MEDICINE

## 2018-01-31 PROCEDURE — 85610 PROTHROMBIN TIME: CPT

## 2018-01-31 PROCEDURE — 84484 ASSAY OF TROPONIN QUANT: CPT

## 2018-01-31 PROCEDURE — 36415 COLL VENOUS BLD VENIPUNCTURE: CPT

## 2018-01-31 PROCEDURE — 93005 ELECTROCARDIOGRAM TRACING: CPT

## 2018-01-31 PROCEDURE — 83605 ASSAY OF LACTIC ACID: CPT

## 2018-01-31 PROCEDURE — 96361 HYDRATE IV INFUSION ADD-ON: CPT

## 2018-01-31 PROCEDURE — 6370000000 HC RX 637 (ALT 250 FOR IP): Performed by: EMERGENCY MEDICINE

## 2018-01-31 PROCEDURE — 81001 URINALYSIS AUTO W/SCOPE: CPT

## 2018-01-31 PROCEDURE — 99285 EMERGENCY DEPT VISIT HI MDM: CPT

## 2018-01-31 PROCEDURE — 80053 COMPREHEN METABOLIC PANEL: CPT

## 2018-01-31 PROCEDURE — 96374 THER/PROPH/DIAG INJ IV PUSH: CPT

## 2018-01-31 PROCEDURE — 87040 BLOOD CULTURE FOR BACTERIA: CPT

## 2018-01-31 PROCEDURE — 70450 CT HEAD/BRAIN W/O DYE: CPT

## 2018-01-31 PROCEDURE — 6360000002 HC RX W HCPCS: Performed by: EMERGENCY MEDICINE

## 2018-01-31 PROCEDURE — 87804 INFLUENZA ASSAY W/OPTIC: CPT

## 2018-01-31 PROCEDURE — 82140 ASSAY OF AMMONIA: CPT

## 2018-01-31 PROCEDURE — 85025 COMPLETE CBC W/AUTO DIFF WBC: CPT

## 2018-01-31 PROCEDURE — 87086 URINE CULTURE/COLONY COUNT: CPT

## 2018-01-31 RX ORDER — CEFDINIR 300 MG/1
300 CAPSULE ORAL 2 TIMES DAILY
Qty: 20 CAPSULE | Refills: 0 | Status: SHIPPED | OUTPATIENT
Start: 2018-01-31 | End: 2018-02-10

## 2018-01-31 RX ORDER — 0.9 % SODIUM CHLORIDE 0.9 %
500 INTRAVENOUS SOLUTION INTRAVENOUS ONCE
Status: COMPLETED | OUTPATIENT
Start: 2018-01-31 | End: 2018-01-31

## 2018-01-31 RX ORDER — CEFTRIAXONE 1 G/1
INJECTION, POWDER, FOR SOLUTION INTRAMUSCULAR; INTRAVENOUS
Status: DISCONTINUED
Start: 2018-01-31 | End: 2018-01-31 | Stop reason: HOSPADM

## 2018-01-31 RX ORDER — ACETAMINOPHEN 500 MG
1000 TABLET ORAL ONCE
Status: COMPLETED | OUTPATIENT
Start: 2018-01-31 | End: 2018-01-31

## 2018-01-31 RX ADMIN — ACETAMINOPHEN 1000 MG: 500 TABLET ORAL at 19:18

## 2018-01-31 RX ADMIN — SODIUM CHLORIDE 500 ML: 9 INJECTION, SOLUTION INTRAVENOUS at 19:59

## 2018-01-31 RX ADMIN — WATER 1 G: 1 INJECTION INTRAMUSCULAR; INTRAVENOUS; SUBCUTANEOUS at 19:59

## 2018-01-31 ASSESSMENT — PAIN SCALES - GENERAL
PAINLEVEL_OUTOF10: 5
PAINLEVEL_OUTOF10: 5

## 2018-01-31 ASSESSMENT — PAIN DESCRIPTION - LOCATION: LOCATION: NECK

## 2018-01-31 ASSESSMENT — PAIN DESCRIPTION - ORIENTATION: ORIENTATION: RIGHT

## 2018-01-31 NOTE — ED NOTES
Son brought pt in for evaluation as directed by Dr. Po Cho (psychologist). Pt has gradually had increased confusion, weakness and now son states that he is hallucinating, seeing and speaking to individuals who are not present. Pt appears emaciated. Son states that he has lost weight over the past couple of months. Pt is alert. Gait is slightly unstable. Pt answers simple questions: the year and his age, both incorrectly. Pt is able to tell me his son's name. Respirations regular and easy. Pt is rubbing right neck and c/o neck and back pain .      Raulito Garcia RN  01/31/18 1701

## 2018-01-31 NOTE — ED PROVIDER NOTES
tablet by mouth three times a day if needed for headache or muscle spasm  0    warfarin (COUMADIN) 2.5 MG tablet Take as directed by Coumadin Clinic 45 tabs = 30 days supply 45 tablet 5    ARIPiprazole (ABILIFY) 2 MG tablet Take 2 mg by mouth daily       tamsulosin (FLOMAX) 0.4 MG capsule Take 0.4 mg by mouth every evening For prostate. 1    fenofibrate (TRICOR) 145 MG tablet take 1 tablet by mouth once daily 90 tablet 3    metoprolol (TOPROL-XL) 25 MG XL tablet Take 25 mg by mouth daily Indications: Raised Blood Pressure   0    ferrous sulfate 325 (65 FE) MG tablet Take 325 mg by mouth daily (with breakfast) Indications: Iron Deficiency       citalopram (CELEXA) 40 MG tablet Take 40 mg by mouth daily Indications: Lowered Mood       atorvastatin (LIPITOR) 80 MG tablet Take 1 tablet by mouth daily. 90 tablet 3    loperamide (IMODIUM) 2 MG capsule Take 2 mg by mouth as needed. Indications: Diarrhea      acetaminophen (APAP EXTRA STRENGTH) 500 MG tablet Take 1,000 mg by mouth every 6 hours as needed. Don't take more than 3,000 mg per day. Indications: Pain 120 tablet 3    omeprazole (PRILOSEC) 20 MG capsule Take 20 mg by mouth daily. Indications: Stomach Discomfort      B-COMPLEX-C PO   Take by mouth daily Indications: Vitamin B Deficiency       warfarin (COUMADIN) 5 MG tablet Dosed by [facility]. 120 tablets for a 90 days supply. 120 tablet 3    testosterone cypionate (DEPOTESTOTERONE CYPIONATE) 200 MG/ML injection Inject 0.5 mLs into the muscle every 14 days.  (Patient taking differently: Inject 25 mg into the muscle every 14 days ) 1 vial 5       ALLERGIES    No Known Allergies    FAMILY HISTORY    Family History   Problem Relation Age of Onset    Breast Cancer Mother 36    Colon Polyps Mother     High Cholesterol Father     High Blood Pressure Father     Stroke Father 45    Allergies Father     Colon Polyps Son     Colon Polyps Daughter     Prostate Cancer Neg Hx        SOCIAL HISTORY    Social History     Social History    Marital status:      Spouse name: N/A    Number of children: N/A    Years of education: N/A     Social History Main Topics    Smoking status: Former Smoker     Years: 10.00     Quit date: 1/1/1983    Smokeless tobacco: Never Used    Alcohol use Yes      Comment: SOCIAL    Drug use: No    Sexual activity: Yes     Other Topics Concern    None     Social History Narrative    None       REVIEW OF SYSTEMS    Reported altered mental status but no headache but some chronic neck pain. No chest pain or abdominal pain. No weakness focally. All systems negative except as marked. PHYSICAL EXAM    VITAL SIGNS: BP (!) 167/92   Pulse 65   Temp 101.3 °F (38.5 °C) (Temporal)   Resp 20   Wt 140 lb (63.5 kg)   SpO2 97%   BMI 20.67 kg/m²    Constitutional:  Alert not toxtic or ill, holding the right side of his neck, otherwise pleasant  HENT:  Normocephalic, Atraumatic, Bilateral external ears normal, Oropharynx moist, No oral exudates, Nose normal.  No trauma  Cervical Spine: Slight decreased range of motion. Eyes:  No discharge or  Swelling,Conjunctiva normal, PERRL, EOMI,  Respiratory: No respiratory distress, Normal breath sounds,  No wheezing, No chest tenderness. Cardiovascular:  Normal heart rate, Normal rhythm, No murmurs, No rubs, No gallops. GI:  No reproducible pain,  Musculoskeletal:  Intact distal pulses, No edema, No tenderness, No cyanosis, No clubbing. Good range of motion in all major joints. No tenderness to palpation or major deformities noted. Back:No tenderness. Integument:  Warm, Dry, No erythema, No rash (on exposed areas)   Lymphatic:  No lymphadenopathy noted. Neurologic:  Alert moves all fours,, Normal motor function, Normal sensory function, No focal deficits noted. Follows commands although at some confusion. EKG    Pacemaker without focal abnormalities. Rate of 63.                   RADIOLOGY    CT Head WO Contrast

## 2018-02-02 LAB
ORGANISM: ABNORMAL
URINE CULTURE REFLEX: ABNORMAL

## 2018-02-06 LAB
BLOOD CULTURE, ROUTINE: NORMAL
BLOOD CULTURE, ROUTINE: NORMAL

## 2018-02-14 ENCOUNTER — APPOINTMENT (OUTPATIENT)
Dept: GENERAL RADIOLOGY | Age: 78
End: 2018-02-14
Payer: MEDICARE

## 2018-02-14 ENCOUNTER — HOSPITAL ENCOUNTER (EMERGENCY)
Age: 78
Discharge: HOME | End: 2018-02-14
Attending: EMERGENCY MEDICINE
Payer: MEDICARE

## 2018-02-14 ENCOUNTER — APPOINTMENT (OUTPATIENT)
Dept: CT IMAGING | Age: 78
End: 2018-02-14
Payer: MEDICARE

## 2018-02-14 VITALS
SYSTOLIC BLOOD PRESSURE: 112 MMHG | HEIGHT: 70 IN | DIASTOLIC BLOOD PRESSURE: 62 MMHG | WEIGHT: 155 LBS | TEMPERATURE: 102.5 F | RESPIRATION RATE: 18 BRPM | HEART RATE: 90 BPM | BODY MASS INDEX: 22.19 KG/M2 | OXYGEN SATURATION: 93 %

## 2018-02-14 DIAGNOSIS — N30.00 ACUTE CYSTITIS WITHOUT HEMATURIA: Primary | ICD-10-CM

## 2018-02-14 DIAGNOSIS — R41.0 DISORIENTATION: ICD-10-CM

## 2018-02-14 DIAGNOSIS — R50.9 FEVER, UNSPECIFIED FEVER CAUSE: ICD-10-CM

## 2018-02-14 DIAGNOSIS — E86.0 DEHYDRATION: ICD-10-CM

## 2018-02-14 LAB
ALBUMIN SERPL-MCNC: 3 GM/DL (ref 3.4–5)
ALP BLD-CCNC: 60 U/L (ref 46–116)
ALT SERPL-CCNC: 16 U/L (ref 14–63)
AMORPHOUS: ABNORMAL
ANION GAP: 9 MEQ/L (ref 8–16)
APTT: 36.2 SECONDS (ref 22–38)
AST SERPL-CCNC: 18 U/L (ref 15–37)
BACTERIA: ABNORMAL
BASOPHILS # BLD: 0.3 % (ref 0–3)
BILIRUB SERPL-MCNC: 0.8 MG/DL (ref 0.2–1)
BILIRUBIN URINE: NEGATIVE
BLOOD, URINE: NEGATIVE
BUN BLDV-MCNC: 20 MG/DL (ref 7–18)
CASTS UA: ABNORMAL /LPF
CHARACTER, URINE: ABNORMAL
CHLORIDE BLD-SCNC: 101 MEQ/L (ref 98–107)
CO2: 27 MEQ/L (ref 21–32)
COLOR: YELLOW
CREAT SERPL-MCNC: 1.3 MG/DL (ref 0.6–1.3)
CRYSTALS, UA: ABNORMAL
EKG ATRIAL RATE: 60 BPM
EKG P AXIS: 91 DEGREES
EKG P-R INTERVAL: 192 MS
EKG Q-T INTERVAL: 394 MS
EKG QRS DURATION: 88 MS
EKG QTC CALCULATION (BAZETT): 394 MS
EKG R AXIS: -44 DEGREES
EKG T AXIS: 74 DEGREES
EKG VENTRICULAR RATE: 60 BPM
EOSINOPHILS RELATIVE PERCENT: 1.2 % (ref 0–4)
EPITHELIAL CELLS, UA: ABNORMAL /HPF
FLU A ANTIGEN: NEGATIVE
FLU B ANTIGEN: NEGATIVE
GFR, ESTIMATED: 57 ML/MIN/1.73M2
GLUCOSE BLD-MCNC: 131 MG/DL (ref 74–106)
GLUCOSE, URINE: NEGATIVE MG/DL
HCT VFR BLD CALC: 40 % (ref 42–52)
HEMOGLOBIN: 13.4 GM/DL (ref 14–18)
INR BLD: 3.36 (ref 0.85–1.13)
KETONES, URINE: NEGATIVE
LACTATE: 1 MMOL/L (ref 0.9–1.7)
LEUKOCYTE ESTERASE, URINE: ABNORMAL
LYMPHOCYTES # BLD: 5.4 % (ref 15–47)
MAGNESIUM: 1.4 MG/DL (ref 1.8–2.4)
MCH RBC QN AUTO: 29.7 PG (ref 27–31)
MCHC RBC AUTO-ENTMCNC: 33.5 GM/DL (ref 33–37)
MCV RBC AUTO: 88.7 FL (ref 80–94)
MONOCYTES: 8.8 % (ref 0–12)
MUCUS: ABNORMAL
NITRITE, URINE: NEGATIVE
PDW BLD-RTO: 11.8 % (ref 11.5–14.5)
PH UA: 7 (ref 5–9)
PLATELET # BLD: 178 THOU/MM3 (ref 130–400)
PMV BLD AUTO: 7.4 FL (ref 7.4–10.4)
POC CALCIUM: 9.5 MG/DL (ref 8.5–10.1)
POTASSIUM SERPL-SCNC: 4.1 MEQ/L (ref 3.5–5.1)
PROTEIN UA: NEGATIVE MG/DL
RBC # BLD: 4.51 MILL/MM3 (ref 4.7–6.1)
RBC UA: ABNORMAL /HPF
REFLEX TO URINE C & S: ABNORMAL
SEGS: 84.3 % (ref 43–75)
SODIUM BLD-SCNC: 137 MEQ/L (ref 136–145)
SPECIFIC GRAVITY UA: 1.01 (ref 1–1.03)
TOTAL PROTEIN: 7.2 GM/DL (ref 6.4–8.2)
TROPONIN I: < 0.017 NG/ML (ref 0.02–0.05)
UROBILINOGEN, URINE: 2 EU/DL (ref 0–1)
WBC # BLD: 10.2 THOU/MM3 (ref 4.8–10.8)
WBC UA: ABNORMAL /HPF

## 2018-02-14 PROCEDURE — 71046 X-RAY EXAM CHEST 2 VIEWS: CPT

## 2018-02-14 PROCEDURE — 36415 COLL VENOUS BLD VENIPUNCTURE: CPT

## 2018-02-14 PROCEDURE — 81001 URINALYSIS AUTO W/SCOPE: CPT

## 2018-02-14 PROCEDURE — 80053 COMPREHEN METABOLIC PANEL: CPT

## 2018-02-14 PROCEDURE — 85610 PROTHROMBIN TIME: CPT

## 2018-02-14 PROCEDURE — 2580000003 HC RX 258: Performed by: EMERGENCY MEDICINE

## 2018-02-14 PROCEDURE — 70450 CT HEAD/BRAIN W/O DYE: CPT

## 2018-02-14 PROCEDURE — 87804 INFLUENZA ASSAY W/OPTIC: CPT

## 2018-02-14 PROCEDURE — 96365 THER/PROPH/DIAG IV INF INIT: CPT

## 2018-02-14 PROCEDURE — 51701 INSERT BLADDER CATHETER: CPT

## 2018-02-14 PROCEDURE — 84484 ASSAY OF TROPONIN QUANT: CPT

## 2018-02-14 PROCEDURE — 87040 BLOOD CULTURE FOR BACTERIA: CPT

## 2018-02-14 PROCEDURE — 85025 COMPLETE CBC W/AUTO DIFF WBC: CPT

## 2018-02-14 PROCEDURE — 93010 ELECTROCARDIOGRAM REPORT: CPT | Performed by: INTERNAL MEDICINE

## 2018-02-14 PROCEDURE — 96361 HYDRATE IV INFUSION ADD-ON: CPT

## 2018-02-14 PROCEDURE — 87086 URINE CULTURE/COLONY COUNT: CPT

## 2018-02-14 PROCEDURE — 6370000000 HC RX 637 (ALT 250 FOR IP): Performed by: EMERGENCY MEDICINE

## 2018-02-14 PROCEDURE — 83605 ASSAY OF LACTIC ACID: CPT

## 2018-02-14 PROCEDURE — 85730 THROMBOPLASTIN TIME PARTIAL: CPT

## 2018-02-14 PROCEDURE — 99285 EMERGENCY DEPT VISIT HI MDM: CPT

## 2018-02-14 PROCEDURE — 83735 ASSAY OF MAGNESIUM: CPT

## 2018-02-14 PROCEDURE — 93005 ELECTROCARDIOGRAM TRACING: CPT | Performed by: EMERGENCY MEDICINE

## 2018-02-14 PROCEDURE — 6360000002 HC RX W HCPCS: Performed by: EMERGENCY MEDICINE

## 2018-02-14 RX ORDER — ACETAMINOPHEN 500 MG
1000 TABLET ORAL ONCE
Status: COMPLETED | OUTPATIENT
Start: 2018-02-14 | End: 2018-02-14

## 2018-02-14 RX ORDER — NITROFURANTOIN 25; 75 MG/1; MG/1
100 CAPSULE ORAL 2 TIMES DAILY
Qty: 10 CAPSULE | Refills: 0 | Status: SHIPPED | OUTPATIENT
Start: 2018-02-14 | End: 2018-01-01

## 2018-02-14 RX ORDER — 0.9 % SODIUM CHLORIDE 0.9 %
30 INTRAVENOUS SOLUTION INTRAVENOUS ONCE
Status: COMPLETED | OUTPATIENT
Start: 2018-02-14 | End: 2018-02-14

## 2018-02-14 RX ADMIN — WATER 1 G: 1 INJECTION INTRAMUSCULAR; INTRAVENOUS; SUBCUTANEOUS at 10:26

## 2018-02-14 RX ADMIN — ACETAMINOPHEN 1000 MG: 500 TABLET ORAL at 09:45

## 2018-02-14 RX ADMIN — SODIUM CHLORIDE 1000 ML: 9 INJECTION, SOLUTION INTRAVENOUS at 08:55

## 2018-02-14 ASSESSMENT — PAIN SCALES - WONG BAKER: WONGBAKER_NUMERICALRESPONSE: 6

## 2018-02-14 ASSESSMENT — ENCOUNTER SYMPTOMS
WHEEZING: 0
SHORTNESS OF BREATH: 0
ABDOMINAL PAIN: 0
SORE THROAT: 0
DIARRHEA: 0
COUGH: 0
SPUTUM PRODUCTION: 0
VOMITING: 0
NAUSEA: 0

## 2018-02-14 ASSESSMENT — PAIN DESCRIPTION - ORIENTATION
ORIENTATION: LEFT;RIGHT
ORIENTATION: LEFT;RIGHT

## 2018-02-14 ASSESSMENT — PAIN SCALES - GENERAL
PAINLEVEL_OUTOF10: 0
PAINLEVEL_OUTOF10: 6

## 2018-02-14 ASSESSMENT — PAIN DESCRIPTION - LOCATION
LOCATION: SHOULDER
LOCATION: SHOULDER

## 2018-02-14 ASSESSMENT — PAIN DESCRIPTION - DESCRIPTORS: DESCRIPTORS: ACHING

## 2018-02-14 NOTE — ED PROVIDER NOTES
[facility]  eMERGENCY dEPARTMENT eNCOUnter             Arnol Fernandes 19 COMPLAINT    Chief Complaint   Patient presents with    Urinary Tract Infection    Fever    Altered Mental Status       Nurses Notes reviewed and I agree except as noted in the HPI. HPI    Yesy Gonzalez is a 66 y.o. male who presents with his daughter, who states that she picked him to take him to appointment in [place] today (follow up for lung cancer), and could tell right away that something was wrong. He was lethargic, very weak, and could not get up on his own. She struggled to get him into the vehicle to bring him here. He is shaky, and his speech is slurred. She last saw him seeming normal last evening. She states that he was seen here a few weeks ago, and found to have a UTI. No recent cough, congestion, vomiting, diarrhea, or noted fever. His antibiotics are gone. He denies pain or dyspnea, dysuria, just complains of weakness. He is tremulous. REVIEW OF SYSTEMS        Review of Systems   Constitutional: Positive for fever (noted here), malaise/fatigue and weight loss. Negative for diaphoresis. HENT: Negative for congestion and sore throat. Respiratory: Negative for cough, sputum production, shortness of breath and wheezing. Cardiovascular: Negative for chest pain, palpitations and leg swelling. Gastrointestinal: Negative for abdominal pain, diarrhea, nausea and vomiting. Genitourinary: Negative for dysuria and flank pain. Musculoskeletal: Negative for falls. Skin: Negative for rash. Neurological: Positive for dizziness and weakness. Negative for focal weakness, loss of consciousness and headaches. Endo/Heme/Allergies: Bruises/bleeds easily (on coumadin). All other systems reviewed and are negative. PAST MEDICAL HISTORY     has a past medical history of Bleeding disorder ([address] Cancer ([facility] [address] Chicken pox; Colon polyps;  Dehydration; Depression; DVT (deep Take as directed by Coumadin Clinic 45 tabs = 30 days supply    WARFARIN (COUMADIN) 5 MG TABLET    Dosed by [facility]. 120 tablets for a 90 days supply. ALLERGIES    has No Known Allergies. FAMILY HISTORY    indicated that the status of his mother is unknown. He indicated that the status of his father is unknown. He indicated that the status of his daughter is unknown. He indicated that the status of his son is unknown. He indicated that the status of his neg hx is unknown.    family history includes Allergies in his father; Breast Cancer (age of onset: 36) in his mother; Colon Polyps in his daughter, mother, and son; High Blood Pressure in his father; High Cholesterol in his father; Stroke (age of onset: 45) in his father. SOCIAL HISTORY     reports that he quit smoking about 35 years ago. He quit after 10.00 years of use. He has never used smokeless tobacco. He reports that he drinks alcohol. He reports that he does not use drugs. PHYSICAL EXAM       INITIAL VITALS: /62   Pulse 90   Temp 102.5 °F (39.2 °C) (Rectal)   Resp 18   Ht 5' 10\" (1.778 m)   Wt 155 lb (70.3 kg)   SpO2 93%   BMI 22.24 kg/m²        Physical Exam   Constitutional:   Frail, shaky, weak, mumbling. Requires 3 people to transfer him from wheelchair to bed. HENT:   Head: Atraumatic. Decreased oral moisture, lips dry   Eyes: Pupils are equal, round, and reactive to light. Neck: Neck supple. Cardiovascular: Normal rate, regular rhythm and intact distal pulses. No murmur heard. Pulmonary/Chest: No respiratory distress. He has no wheezes. Decreased breath sounds, clear. Abdominal: Soft. Bowel sounds are normal. He exhibits no mass. There is no tenderness. There is no guarding. Musculoskeletal: He exhibits tenderness (shoulders). He exhibits no edema. Lymphadenopathy:     He has no cervical adenopathy. Neurological:   Oriented to person, able to answer yes and no questions, cooperative. ANTIGENS   CULTURE BLOOD #2   CULTURE BLOOD #1   URINE CULTURE, REFLEXED   APTT   TROPONIN   LACTIC ACID   ANION GAP     Multiple labs were evaluated and surprisingly looked extremely good for this chronically ill individual.  Specifically lactic acid was normal making the likelihood of high-grade sepsis very low. Minimal abnormalities in the urine were identified but he does appear to be prerenal and azotemic and may likely represent dehydration. Influenza tests were confirmed as well as negative on rapid testing  Vitals:    Vitals:    02/14/18 1040 02/14/18 1054 02/14/18 1109 02/14/18 1123   BP: 119/72 122/72 117/78 112/62   Pulse: 88 87 93 90   Resp: 14 20 18 18   Temp:       TempSrc:       SpO2: 93% 93% 93% 93%   Weight:       Height:           EMERGENCY DEPARTMENT COURSE:    Patient was fully evaluated and given 2000 mL crystalloid hydration. Fever was controlled with Tylenol. He was markedly improved at time of disposition was up and around walking to the bathroom animated although pleasantly confused. According to daughter this is basically back to his baseline. They're all comfortable with discharged to family care and understand we don't have a confirmed source but we'll be treating him empirically for UTI a few days of antibiotics. His INR was mildly up at 3.36 and he'll be holding his Coumadin for 2 daysdiscussed with DAUGHTER . Likewise he may need to be returned if he cannot control fevers and cannot maintain hydration and will let her hospitalization at that time      FINAL IMPRESSION      1. Acute cystitis without hematuria    2. Dehydration    3. Fever, unspecified fever cause    4.  Disorientation        DISPOSITION/PLANDischarged in stable and improved condition DISPOSITION Decision To Discharge 02/14/2018 12:02:59 PM      PATIENT REFERRED TO:    Milena Manning MD  [address] [place], [place] [address] [phone]    In 2 days  return here, As needed, If symptoms

## 2018-02-14 NOTE — ED NOTES
Walked to discharge lobby. Daughter and patient updated and denies concerns or questions.      Adama Baires RN  02/14/18 2552

## 2018-02-16 LAB — URINE CULTURE REFLEX: NO GROWTH

## 2018-02-20 NOTE — PROGRESS NOTES
The [address] [facility]  Anticoagulation Clinic  [phone] (phone)  [phone] (fax)    Mr. Isabelle Angulo is a 66 y.o.  male with history of persistent atrial fib./history of PE/DVT, per Dr. Darnella Nissen referral, who presents today for Warfarin monitoring and adjustment (3 week visit). Daughter had pill list on phone - using 2.5 mg tablet. Followed printed instructions for dose. Vesicare and Aricept have been added. Takes everything in A.M. Recommended Flomax be taken in evening, unless doctor ordered otherwise. On lower dose of Lipitor. No extra doses. Missed 3.75 mg dose 2/15 and 2.5 mg dose 2/16 per order of ER physician for 3.36 INR 2/14; there for acute cystitis, dehydration, fever, disorientation (5 day course of Macrobid ordered). Had also gone to ER 1/31 for altered mental status, acute cystitis (10 day course of Cefdinir ordered); INR then was 2.98. Patient denies s/s bleeding/bruising/swelling/SOB/chest pain. No blood in urine or stool. No dietary changes. No change in tobacco use. Has had no alcohol all month - this is new. Change in activity level:  decreased. Patient denies headaches/lightheadedness/falls. Has dizziness with position change. No vomiting/diarrhea or acute illness. No procedures scheduled in the future at this time. Has been having neck/left shoulder pain. Had Cortisone injection last week. Hast been taking 4-6 Tylenol/day for past 2 weeks. Lab Results   Component Value Date    INR 3.30 (H) 02/20/2018    INR 3.36 (H) 02/14/2018    INR 2.98 (H) 01/31/2018     INR supratherapeutic - goal 2-3. Recent Labs      02/20/18   1311   INR  3.30*       Plan: POCT INR ordered/performed/result reviewed. Hold today, T, then decrease PO Coumadin to 3.75 mg MF, 2.5 mg TWThSS (from 2.5 mg MF, 3.75 mg TWThSS). Recheck INR in 2 weeks. (Report given - orders entered by KELI Carney, PharmD.)  Patient reminded to call the Anticoagulation

## 2018-03-06 NOTE — ED NOTES
Discharge instructions reviewed with patient and family, questions answered. Steady on feet with one assist.  Skin warm and dry, color normal for ethnicity. Remains alert and cooperative. Denies further questions or concerns at this time.      Pat Sandoval RN  03/06/18 4670

## 2018-03-06 NOTE — ED PROVIDER NOTES
[facility]  eMERGENCY dEPARTMENT eNCOUnter             Arnol Fernandes 19 COMPLAINT    Chief Complaint   Patient presents with    Fever    Fatigue       Nurses Notes reviewed and I agree except as noted in the HPI. HPI    Yesy Gonzalez is a 66 y.o. male who presents with his daughter and granddaughter, who added to the history. According to the patient, in the middle of the night last night, he got up to go to the bathroom, and tripped and fell, striking the left side of his head on his \"breathing machine\", and landed on his left shoulder. He had difficulty getting off the floor, but his son, who stays with him at night, came and helped him up, and he went back to bed. He states that today, he felt shaky and weak. He states that his head really doesn't hurt, and his left shoulder \"feels fine\". However, his right shoulder, which has given him chronic pain, is still painful. The family members state that since yesterday, they have noticed that he has had decrease in his memory, has been more confused, and has had intermittent fevers. Yesterday, his temperature was 99.6, and this morning, at 9:00, it was 101.6. They noticed that he was very shaky, his blood pressure was lower than usual, his memory was worse, and he was more confused, so they brought him here for evaluation, after his home physical therapist confirmed their findings. They have found in the past that when these symptoms occur, it is usually due to a urinary tract infection. He has known urinary retention, and is [facility]. He is supposed to have a procedure done soon, to help with the urinary retention. He was actually supposed to get preop laboratory values done today. He has been seeing Dr. Demarcus Hua here for his right shoulder pain, and has had a cortisone shot in that shoulder, which helped.   Family members heard mention of \"pseudogout\"  a diagnosis. His family states that last week, he had a great week. He was in his best mental state, and was physically active, walking daily, as is his habit. His daughter also states that the patient walked \"2 miles \"yesterday, but seemed very fatigued after that walk. I note from his recent records here that he has been seen twice with final diagnosis of urinary tract infection, once about a month ago, and then again about 2 weeks ago. Subsequent to those visits, blood and urine cultures have come back negative. I do not have access to records from [facility].    He is on chronic anticoagulant therapy due to atrial fibrillation. He has a past history of prostate cancer, treated with surgery and radiation therapy. He has had previous DVT, and has an IVC filter, as well as his chronic anticoagulant therapy. REVIEW OF SYSTEMS        Review of Systems   Constitutional: Positive for chills, fever and malaise/fatigue. Negative for diaphoresis. HENT: Positive for congestion (nasal only). Negative for sore throat. Respiratory: Negative for cough, sputum production, shortness of breath and wheezing. Cardiovascular: Negative for chest pain, palpitations and leg swelling. Gastrointestinal: Negative for abdominal pain, constipation, diarrhea and nausea. Genitourinary: Positive for frequency. Negative for dysuria, flank pain and hematuria. Hesitancy, incomplete emptying. Sometimes hard to go. Musculoskeletal: Positive for falls. Negative for back pain and neck pain. Skin: Negative for rash. Neurological: Positive for tremors and weakness. Negative for dizziness, speech change, focal weakness, loss of consciousness and headaches. Just seems more confused   Endo/Heme/Allergies: Bruises/bleeds easily. PAST MEDICAL HISTORY     has a past medical history of Bleeding disorder ([facility] [address] Cancer ([facility] [address] Chicken pox; Colon polyps;  Dehydration; Depression; DVT (deep venous thrombosis) ([facility] [address] Esophageal spasm; History of esophageal dilatation; Hyperlipidemia; Hypertension; PE (pulmonary embolism); Sleep apnea; Sleep apnea; Type II or unspecified type diabetes mellitus without mention of complication, not stated as uncontrolled; and Typhoid fever. SURGICAL HISTORY     has a past surgical history that includes Tonsillectomy and adenoidectomy; Ankle fracture surgery; back surgery (1993); Total knee arthroplasty (2011); pacemaker placement (2012); Colonoscopy (2013); colectomy (1999); and Upper gastrointestinal endoscopy. CURRENT MEDICATIONS    Discharge Medication List as of 3/6/2018 12:26 PM      CONTINUE these medications which have NOT CHANGED    Details   !! warfarin (COUMADIN) 2.5 MG tablet Take as directed by Coumadin Clinic 45 tabs = 30 days supply, Disp-45 tablet, R-5Normal      acetaminophen (APAP EXTRA STRENGTH) 500 MG tablet Take 1,000 mg by mouth every 6 hours as needed. Don't take more than 3,000 mg per day. Indications: Pain, Disp-120 tablet, R-3      donepezil (ARICEPT) 5 MG tablet take 1 tablet by mouth once daily, R-0Historical Med      Nutritional Supplements (VITAMIN D MAINTENANCE PO) Take 1 tablet by mouth daily Historical Med      VESICARE 10 MG tablet Take 10 mg by mouth daily , R-0, DAWHistorical Med      !! warfarin (COUMADIN) 5 MG tablet Dosed by [facility]. 120 tablets for a 90 days supply. , Disp-120 tablet, R-3Normal      ARIPiprazole (ABILIFY) 2 MG tablet Take 2 mg by mouth daily Historical Med      tamsulosin (FLOMAX) 0.4 MG capsule Take 0.4 mg by mouth every evening For prostate., R-1      fenofibrate (TRICOR) 145 MG tablet take 1 tablet by mouth once daily, Disp-90 tablet, R-3      metoprolol (TOPROL-XL) 25 MG XL tablet Take 25 mg by mouth daily Indications: Raised Blood Pressure , R-0      ferrous sulfate 325 (65 FE) MG tablet Take 325 mg by mouth daily (with breakfast) Indications: Iron Deficiency       citalopram (CELEXA) recognition technology. **      Final report electronically signed by Dr. Ernestina Self on 3/6/2018 11:43 AM      XR CHEST STANDARD (2 VW)   Final Result   No acute intrathoracic process. **This report has been created using voice recognition software. It may contain minor errors which are inherent in voice recognition technology. **      Final report electronically signed by Dr. Ernestina Self on 3/6/2018 11:24 AM             LABS:     Labs Reviewed   CBC WITH AUTO DIFFERENTIAL - Abnormal; Notable for the following:        Result Value    RBC 4.36 (*)     Hemoglobin 12.8 (*)     Hematocrit 38.0 (*)     MPV 7.3 (*)     Lymphocytes 11.1 (*)     Monocytes 12.9 (*)     All other components within normal limits   COMPREHENSIVE METABOLIC PANEL - Abnormal; Notable for the following:     Glucose 109 (*)     Alb 3.0 (*)     All other components within normal limits   URINE RT REFLEX TO CULTURE - Abnormal; Notable for the following:     Blood, Urine TRACE (*)     Protein, UA TRACE (*)     All other components within normal limits   MAGNESIUM - Abnormal; Notable for the following:     Magnesium 1.5 (*)     All other components within normal limits   LACTIC ACID - Abnormal; Notable for the following:     Lactate 0.75 (*)     All other components within normal limits   PROTIME - Abnormal; Notable for the following:     INR 2.24 (*)     All other components within normal limits   GLOMERULAR FILTRATION RATE, ESTIMATED - Abnormal; Notable for the following:     GFR, Estimated 69 (*)     All other components within normal limits   RAPID INFLUENZA A/B ANTIGENS   CULTURE BLOOD #2   CULTURE BLOOD #1   URINE CULTURE   TROPONIN   ANION GAP       Vitals:    Vitals:    03/06/18 1112 03/06/18 1138 03/06/18 1155 03/06/18 1232   BP: 97/69 109/64 (!) 93/58 104/80   Pulse: (!) 48 (!) 48 (!) 48 55   Resp:   18    Temp:   98.6 °F (37 °C)    TempSrc:   Oral    SpO2: 95% 95% 94% 95%   Weight:       Height:           EMERGENCY

## 2018-03-06 NOTE — ED NOTES
Presents with complaints of fever and fatigue since yesterday. Family relates that patient has had problems with UTI's.      Hilda Valencia RN  03/06/18 6159

## 2018-03-06 NOTE — TELEPHONE ENCOUNTER
INR checked today was 2.24. Going home on Macrobid. Pt to continue on Coumadin 3.75mg MF, 2.5mg TWTHSS. May recheck in 2 weeks.

## 2018-03-06 NOTE — ED NOTES
Straight cathed for a small amount of dark yellow urine. Specimen to lab. Patient remains alert and cooperative. Skin warm and dry, cheeks flushed.      Trell Schmid RN  03/06/18 1233

## 2018-03-07 NOTE — PROGRESS NOTES
This patient is followed regularly by Dr. Reginald Archuleta MD.      Chief Complaint   Patient presents with    Check-Up     sss    Cardiac Clearance    Atrial Fibrillation         Past Medical History:   Diagnosis Date    Bleeding disorder ([address]     Cancer ([facility] [address]     prostate and lung    Chicken pox     Colon polyps     Dehydration 03/2016    Dementia 2017    Depression     DVT (deep venous thrombosis) (HCC)     x2    Esophageal spasm     History of esophageal dilatation     Hyperlipidemia     Hypertension     PE (pulmonary embolism)     x2    Sleep apnea     Sleep apnea     Type II or unspecified type diabetes mellitus without mention of complication, not stated as uncontrolled     Typhoid fever        Past Surgical History:   Procedure Laterality Date    ANKLE FRACTURE SURGERY      [address]    for multiple poylps    COLONOSCOPY  2013    PACEMAKER PLACEMENT  2012    TONSILLECTOMY AND ADENOIDECTOMY      TOTAL KNEE ARTHROPLASTY  2011    UPPER GASTROINTESTINAL ENDOSCOPY      with dilation       Family History   Problem Relation Age of Onset    Breast Cancer Mother 36    Colon Polyps Mother     High Cholesterol Father     High Blood Pressure Father     Stroke Father 45    Allergies Father     Colon Polyps Son     Colon Polyps Daughter     Prostate Cancer Neg Hx        Personal History:  Oniel Ken  reports that he quit smoking about 35 years ago. He quit after 10.00 years of use. He has never used smokeless tobacco. He reports that he drinks alcohol. He reports that he does not use drugs. Not in a hospital admission.     Current Outpatient Prescriptions   Medication Sig Dispense Refill    sertraline (ZOLOFT) 50 MG tablet Take 50 mg by mouth daily      nitrofurantoin, macrocrystal-monohydrate, (MACROBID) 100 MG capsule Take 1 capsule by mouth 2 times daily for 10 days 20 capsule 0    magnesium oxide (MAGOX 400) 400 (241.3 Mg) MG TABS tablet Take 1 hematuria  Musculoskeletal ROS: negative  Neurological ROS: c/o dizziness  Dermatological ROS: negative      /72   Pulse 76   Ht 5' 10\" (1.778 m)   Wt 152 lb (68.9 kg)   BMI 21.81 kg/m²       Physical Examination: General appearance - alert, well appearing, and in no distress  Mental status - alert, oriented to person, place, and time  Neck - supple, no significant adenopathy, no JVD  Chest - clear to auscultation, no wheezes, rales or rhonchi  Heart - normal rate, regular rhythm, normal S1, S2, no murmurs  Abdomen - soft, nontender, nondistended, no masses or organomegaly  Neurological - alert, oriented, normal speech, no focal findings  Musculoskeletal - no joint tenderness, deformity or swelling  Extremities - peripheral pulses normal, no pedal edema  Skin - normal coloration and turgor, no rashes      EKG   NSR    Echo 2015  Size was normal.  Systolic function was normal. Ejection fraction was estimated in the range of 55 % to 65 %. There were no regional wall motion abnormalities. Features were consistent with a pseudonormal left ventricular filling pattern, with concomitant abnormal relaxation and increased filling pressure (grade 2 diastolic dysfunction).     Stress test 2015  No ischemia    CTA of chest 2016  Thoracic Ascending Aneurysm - 4.2 cm      Recent Labs      03/06/18   1048   WBC  6.9   HGB  12.8*   HCT  38.0*   MCV  87.1   PLT  211       Recent Labs      03/06/18   1048   NA  138   K  3.7   CL  102   CO2  25   BUN  14   CREATININE  1.1       Lab Results   Component Value Date    ALKPHOS 65 03/06/2018    ALT 18 03/06/2018    AST 20 03/06/2018    PROT 6.8 03/06/2018    BILITOT 0.8 03/06/2018    BILIDIR <0.2 09/11/2016    LABALBU 3.0 03/06/2018    LABALBU 4.0 03/29/2012       Lab Results   Component Value Date    LABA1C 5.5 01/30/2017        Lab Results   Component Value Date    TRIG 41 09/11/2016    HDL 69 09/11/2016    LDLCALC 73 09/11/2016       Lab Results   Component Value Date    TSH

## 2018-03-07 NOTE — PROGRESS NOTES
Former Bruno pt 10 mo check up pre op  Clearance needed [facility] Green [facility] Laser Prostatectomy with Dr. Wynelle Lefort    Pt denies chest pain, dizziness, peripheral edema, heart palpitations, sob

## 2018-03-15 NOTE — TELEPHONE ENCOUNTER
----- Message from Jacque Orellana MD sent at 3/14/2018  6:09 PM EDT -----  Let the PCP know the result  Please let patient know about the result. No new recommendations.

## 2018-03-19 NOTE — TELEPHONE ENCOUNTER
Spoke with Nancy Crawford. Advised that [facility] pharmacist should be dosing his Coumadin as they would know his INR and if he can restart Coumadin and if he is on any interacting medications. Mando Pandya to call clinic tomorrow if he does not receive any directions on discharge from the hospital. She voices understanding.

## 2018-06-04 PROBLEM — Z98.890: Status: ACTIVE | Noted: 2018-01-01

## 2018-06-04 PROBLEM — R41.3 MEMORY CHANGE: Status: ACTIVE | Noted: 2017-08-29

## 2018-06-04 PROBLEM — M11.20 CALCIUM PYROPHOSPHATE DEPOSITION DISEASE: Status: ACTIVE | Noted: 2018-01-01

## 2018-08-03 NOTE — PROGRESS NOTES
Medication Management [facility]  Anticoagulation Clinic  [phone] (phone)  [phone] (fax)      Mr. Dao Bliss is a 66 y.o.  male with history of DVT who presents today for anticoagulation monitoring and adjustment. Patient verifies current dosing regimen and tablet strength. No missed or extra doses. Pt may have taken his dose late yesterday. Patient denies s/s bleeding/bruising/swelling/SOB/chest pain  No blood in urine or stool. No dietary changes. No changes in medication/OTC agents/Herbals. No change in alcohol use or tobacco use. No change in activity level. Patient denies headaches/dizziness/lightheadedness/falls. No vomiting/diarrhea or acute illness. No Procedures scheduled in the future at this time. Assessment:   Lab Results   Component Value Date    INR 1.50 (H) 08/03/2018    INR 1.70 (H) 07/20/2018    INR 1.90 (H) 07/06/2018     INR subtherapeutic   Recent Labs      08/03/18   0853   INR  1.50*         Plan:  Take 5mg Coumadin today then increase Coumadin 3.75 mg daily. Recheck INR 2 weeks. Patient reminded to call the Anticoagulation Clinic with signs or symptoms of bleeding or with any medication changes. Patient given instructions utilizing the teach back method. Assessment and plan reviewed with Palmira Osorio PharmD. Discharged ambulatory in no apparent distress. After visit summary printed and reviewed with patient.       Medications reviewed and updated on home medication list Yes    Influenza vaccine:     [] given    [x] declined   [x] received previously   [] plans to receive at a later time   [] refused    [x] documented in EPIC

## 2018-09-05 NOTE — PROGRESS NOTES
daily       ARIPiprazole (ABILIFY) 2 MG tablet Take 2 mg by mouth daily       tamsulosin (FLOMAX) 0.4 MG capsule Take 0.4 mg by mouth every evening For prostate. 1    fenofibrate (TRICOR) 145 MG tablet take 1 tablet by mouth once daily 90 tablet 3    metoprolol (TOPROL-XL) 25 MG XL tablet Take 25 mg by mouth daily Indications: Raised Blood Pressure   0    ferrous sulfate 325 (65 FE) MG tablet Take 325 mg by mouth daily (with breakfast) Indications: Iron Deficiency       atorvastatin (LIPITOR) 80 MG tablet Take 1 tablet by mouth daily. (Patient taking differently: Take 40 mg by mouth daily ) 90 tablet 3    loperamide (IMODIUM) 2 MG capsule Take 2 mg by mouth as needed. Indications: Diarrhea      acetaminophen (APAP EXTRA STRENGTH) 500 MG tablet Take 1,000 mg by mouth every 6 hours as needed. Don't take more than 3,000 mg per day. Indications: Pain 120 tablet 3    omeprazole (PRILOSEC) 20 MG capsule Take 20 mg by mouth daily. Indications: Stomach Discomfort      B-COMPLEX-C PO   Take by mouth daily Indications: Vitamin B Deficiency        No current facility-administered medications for this visit. Social History     Social History    Marital status:       Spouse name: N/A    Number of children: N/A    Years of education: N/A     Social History Main Topics    Smoking status: Former Smoker     Years: 10.00     Quit date: 1/1/1983    Smokeless tobacco: Never Used    Alcohol use Yes      Comment: SOCIAL    Drug use: No    Sexual activity: Yes     Other Topics Concern    Not on file     Social History Narrative    No narrative on file       Family History   Problem Relation Age of Onset    Breast Cancer Mother 36    Colon Polyps Mother     High Cholesterol Father     High Blood Pressure Father     Stroke Father 45    Allergies Father     Colon Polyps Son     Colon Polyps Daughter     Prostate Cancer Neg Hx        Blood pressure 122/70, pulse 60, height 5' 6\" (1.676 m), weight

## 2018-09-10 PROBLEM — G30.1 LATE ONSET ALZHEIMER'S DISEASE WITHOUT BEHAVIORAL DISTURBANCE (HCC): Status: ACTIVE | Noted: 2018-01-01

## 2018-09-10 PROBLEM — F02.80 LATE ONSET ALZHEIMER'S DISEASE WITHOUT BEHAVIORAL DISTURBANCE (HCC): Status: ACTIVE | Noted: 2018-01-01

## 2019-01-01 ENCOUNTER — HOSPITAL ENCOUNTER (OUTPATIENT)
Dept: PHARMACY | Age: 79
Setting detail: THERAPIES SERIES
Discharge: HOME | End: 2019-01-29
Payer: MEDICARE

## 2019-01-01 ENCOUNTER — TELEPHONE (OUTPATIENT)
Dept: PHARMACY | Age: 79
End: 2019-01-01

## 2019-01-01 ENCOUNTER — HOSPITAL ENCOUNTER (OUTPATIENT)
Dept: PHARMACY | Age: 79
Setting detail: THERAPIES SERIES
Discharge: HOME | End: 2019-01-22
Payer: MEDICARE

## 2019-01-01 ENCOUNTER — APPOINTMENT (OUTPATIENT)
Dept: PHARMACY | Age: 79
End: 2019-01-01
Payer: MEDICARE

## 2019-01-01 ENCOUNTER — HOSPITAL ENCOUNTER (OUTPATIENT)
Age: 79
Discharge: HOME | End: 2019-01-22
Payer: MEDICARE

## 2019-01-01 ENCOUNTER — HOSPITAL ENCOUNTER (OUTPATIENT)
Dept: PHARMACY | Age: 79
Setting detail: THERAPIES SERIES
Discharge: HOME | End: 2019-01-25
Payer: MEDICARE

## 2019-01-01 ENCOUNTER — HOSPITAL ENCOUNTER (OUTPATIENT)
Dept: GENERAL RADIOLOGY | Age: 79
Discharge: HOME | End: 2019-01-22
Payer: MEDICARE

## 2019-01-01 ENCOUNTER — HOSPITAL ENCOUNTER (OUTPATIENT)
Dept: PHARMACY | Age: 79
Setting detail: THERAPIES SERIES
Discharge: HOME | End: 2019-01-03
Payer: MEDICARE

## 2019-01-01 DIAGNOSIS — I82.409 DEEP VEIN THROMBOSIS (DVT) OF LOWER EXTREMITY, UNSPECIFIED CHRONICITY, UNSPECIFIED LATERALITY, UNSPECIFIED VEIN (HCC): ICD-10-CM

## 2019-01-01 DIAGNOSIS — M25.512 LEFT SHOULDER PAIN, UNSPECIFIED CHRONICITY: ICD-10-CM

## 2019-01-01 LAB
HCT VFR BLD CALC: 42.6 % (ref 42–52)
HEMOGLOBIN: 13.7 GM/DL (ref 14–18)
INR BLD: 5.25 (ref 0.85–1.13)
POC INR: 2.3 (ref 0.8–1.2)
POC INR: 3.1 (ref 0.8–1.2)
POC INR: 3.1 (ref 0.8–1.2)

## 2019-01-01 PROCEDURE — 99211 OFF/OP EST MAY X REQ PHY/QHP: CPT

## 2019-01-01 PROCEDURE — 36416 COLLJ CAPILLARY BLOOD SPEC: CPT | Performed by: PHARMACIST

## 2019-01-01 PROCEDURE — 99211 OFF/OP EST MAY X REQ PHY/QHP: CPT | Performed by: PHARMACIST

## 2019-01-01 PROCEDURE — 73030 X-RAY EXAM OF SHOULDER: CPT

## 2019-01-01 PROCEDURE — 85610 PROTHROMBIN TIME: CPT | Performed by: PHARMACIST

## 2019-01-01 PROCEDURE — 85014 HEMATOCRIT: CPT

## 2019-01-01 PROCEDURE — 85610 PROTHROMBIN TIME: CPT

## 2019-01-01 PROCEDURE — 36416 COLLJ CAPILLARY BLOOD SPEC: CPT

## 2019-01-01 PROCEDURE — 85018 HEMOGLOBIN: CPT

## 2019-01-01 RX ORDER — SULFAMETHOXAZOLE AND TRIMETHOPRIM 800; 160 MG/1; MG/1
1 TABLET ORAL 2 TIMES DAILY
COMMUNITY
Start: 2019-01-01 | End: 2019-01-01

## 2019-01-01 RX ORDER — COLCHICINE 0.6 MG/1
0.3 TABLET ORAL DAILY
COMMUNITY